# Patient Record
Sex: MALE
[De-identification: names, ages, dates, MRNs, and addresses within clinical notes are randomized per-mention and may not be internally consistent; named-entity substitution may affect disease eponyms.]

---

## 2017-09-12 ENCOUNTER — APPOINTMENT (OUTPATIENT)
Dept: UROLOGY | Facility: CLINIC | Age: 77
End: 2017-09-12
Payer: MEDICAID

## 2017-09-12 VITALS
SYSTOLIC BLOOD PRESSURE: 127 MMHG | RESPIRATION RATE: 18 BRPM | BODY MASS INDEX: 27.1 KG/M2 | TEMPERATURE: 97.9 F | HEART RATE: 91 BPM | OXYGEN SATURATION: 95 % | WEIGHT: 153 LBS | DIASTOLIC BLOOD PRESSURE: 73 MMHG

## 2017-09-12 DIAGNOSIS — R31.0 GROSS HEMATURIA: ICD-10-CM

## 2017-09-12 DIAGNOSIS — Z00.00 ENCOUNTER FOR GENERAL ADULT MEDICAL EXAMINATION W/OUT ABNORMAL FINDINGS: ICD-10-CM

## 2017-09-12 DIAGNOSIS — N30.40 IRRADIATION CYSTITIS W/OUT HEMATURIA: ICD-10-CM

## 2017-09-12 DIAGNOSIS — C61 MALIGNANT NEOPLASM OF PROSTATE: ICD-10-CM

## 2017-09-12 PROCEDURE — 99214 OFFICE O/P EST MOD 30 MIN: CPT

## 2017-09-19 LAB
APPEARANCE: CLEAR
BACTERIA: NEGATIVE
BILIRUBIN URINE: NEGATIVE
BLOOD URINE: NEGATIVE
COLOR: YELLOW
CORE LAB FLUID CYTOLOGY: NORMAL
GLUCOSE QUALITATIVE U: NORMAL MG/DL
HYALINE CASTS: 1 /LPF
KETONES URINE: NEGATIVE
LEUKOCYTE ESTERASE URINE: NEGATIVE
MICROSCOPIC-UA: NORMAL
NITRITE URINE: NEGATIVE
PH URINE: 5
PROTEIN URINE: 30 MG/DL
PSA SERPL-MCNC: <0.01 NG/ML
RED BLOOD CELLS URINE: 5 /HPF
SPECIFIC GRAVITY URINE: 1.03
SQUAMOUS EPITHELIAL CELLS: 1 /HPF
UROBILINOGEN URINE: NORMAL MG/DL
WHITE BLOOD CELLS URINE: 1 /HPF

## 2019-03-14 ENCOUNTER — RESULT REVIEW (OUTPATIENT)
Age: 79
End: 2019-03-14

## 2019-03-14 ENCOUNTER — OUTPATIENT (OUTPATIENT)
Dept: OUTPATIENT SERVICES | Facility: HOSPITAL | Age: 79
LOS: 1 days | End: 2019-03-14
Payer: MEDICAID

## 2019-03-14 DIAGNOSIS — Z90.79 ACQUIRED ABSENCE OF OTHER GENITAL ORGAN(S): Chronic | ICD-10-CM

## 2019-03-14 DIAGNOSIS — Z98.41 CATARACT EXTRACTION STATUS, RIGHT EYE: Chronic | ICD-10-CM

## 2019-03-14 DIAGNOSIS — Z86.010 PERSONAL HISTORY OF COLONIC POLYPS: ICD-10-CM

## 2019-03-14 PROCEDURE — C1889: CPT

## 2019-03-14 PROCEDURE — 45381 COLONOSCOPY SUBMUCOUS NJX: CPT

## 2019-03-14 PROCEDURE — 45385 COLONOSCOPY W/LESION REMOVAL: CPT

## 2021-12-20 PROBLEM — Z00.00 ENCOUNTER FOR PREVENTIVE HEALTH EXAMINATION: Noted: 2021-12-20

## 2021-12-27 ENCOUNTER — APPOINTMENT (OUTPATIENT)
Dept: COLORECTAL SURGERY | Facility: CLINIC | Age: 81
End: 2021-12-27
Payer: MEDICARE

## 2021-12-27 VITALS
BODY MASS INDEX: 26.75 KG/M2 | HEIGHT: 63 IN | DIASTOLIC BLOOD PRESSURE: 61 MMHG | HEART RATE: 88 BPM | SYSTOLIC BLOOD PRESSURE: 126 MMHG | WEIGHT: 151 LBS | TEMPERATURE: 98.1 F

## 2021-12-27 DIAGNOSIS — G47.30 SLEEP APNEA, UNSPECIFIED: ICD-10-CM

## 2021-12-27 DIAGNOSIS — E78.00 PURE HYPERCHOLESTEROLEMIA, UNSPECIFIED: ICD-10-CM

## 2021-12-27 DIAGNOSIS — Z80.42 FAMILY HISTORY OF MALIGNANT NEOPLASM OF PROSTATE: ICD-10-CM

## 2021-12-27 DIAGNOSIS — Z78.9 OTHER SPECIFIED HEALTH STATUS: ICD-10-CM

## 2021-12-27 DIAGNOSIS — M19.90 UNSPECIFIED OSTEOARTHRITIS, UNSPECIFIED SITE: ICD-10-CM

## 2021-12-27 DIAGNOSIS — K63.89 OTHER SPECIFIED DISEASES OF INTESTINE: ICD-10-CM

## 2021-12-27 DIAGNOSIS — Z85.46 PERSONAL HISTORY OF MALIGNANT NEOPLASM OF PROSTATE: ICD-10-CM

## 2021-12-27 DIAGNOSIS — Z80.0 FAMILY HISTORY OF MALIGNANT NEOPLASM OF DIGESTIVE ORGANS: ICD-10-CM

## 2021-12-27 PROCEDURE — 99205 OFFICE O/P NEW HI 60 MIN: CPT

## 2021-12-27 NOTE — ASSESSMENT
[FreeTextEntry1] : I had extensive discussion with the patient (60 minutes) regarding the diagnosis and treatment options. I recommended that he consider proceeding with a robotic transverse colectomy possible extended right hemicolectomy.\par The associated risks, benefits, alternatives of the procedure have been outlined discussed and reviewed with the patient's family. These risks including but not limited to bleeding, infection, anastomotic leak, need for secondary surgery, need for ileostomy or colostomy creation, change in bowel habits, dvt,pe, as well as the risk of heart and lung complications infection and death were detailed. The patient understands these risks and consents the planned procedure. Appropriate  literature regarding surgery and post operative treatment/complications and enhanced recovery pathway has been detailed and reviewed. Consent was obtained. All questions were answered.\par \par A chinese  was utilized for the entire visit . All questions were addressed.\par

## 2021-12-27 NOTE — PHYSICAL EXAM
[Abdomen Masses] : No abdominal masses [Abdomen Tenderness] : ~T No ~M abdominal tenderness [No HSM] : no hepatosplenomegaly [JVD] : no jugular venous distention  [Normal Breath Sounds] : Normal breath sounds [Normal Heart Sounds] : normal heart sounds [No Rash or Lesion] : No rash or lesion [Alert] : not alert [Calm] : calm

## 2021-12-27 NOTE — HISTORY OF PRESENT ILLNESS
[FreeTextEntry1] : 80 yo Mandarin speaking M presents w/ son for evaluation of newly diagnosed colon cancer\par PMH HLD, DM type II, arthritis, sleep apnea (not on CPAP), cardiac arrhythmia (under the care of Dr. 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 Denies h/o chemo. PCP monitors PSA and otherwise normal per patient\par \par Colonoscopy completed 12/18/21 with Dr. Allen for rectal bleeding and lower abdominal pain\par - infiltrative non-obstructing mass found in the transverse colon. Mass was partially circumferential and measured 4 cm. Biopsied and tattooed proximal to the lesion\par - small internal hemorrhoids\par Pathology:\par Fragments of colonic mucosa w/ HGD, suspicious for underlying invasive adenocarcinoma\par Scheduled for CT a/p w/ contrast at [facility] on 12/27/21\par \par Last colonoscopy completed 12/2018, TVA removed\par h/o laterally spreading granular polyp measuring 2 cm in transverse colon on 12/2018 colonoscopy, biopsy c/w TVA, s/p EMR at [facility] 1/2019 for removal of polyp w/ negative pathology as per GI notes\par \par Pt reports he is doing well. Appetite and energy good, denies unintentional weight loss\par Pt denies h/o abdominal pain.\par Reports occasional BRB noted on TP w/ some BMs\par BH: three times daily, soft/formed\par Reports adequate dietary fiber and water intake\par Does not take stool softeners or fiber supplement\par Mother diagnosed w/ colon cancer this year, age 90's. Likely palliative care, has home attendant and lives w/ patient\par Denies ASA/NSAID use

## 2022-01-10 ENCOUNTER — APPOINTMENT (OUTPATIENT)
Dept: CT IMAGING | Facility: CLINIC | Age: 82
End: 2022-01-10
Payer: MEDICARE

## 2022-01-10 ENCOUNTER — OUTPATIENT (OUTPATIENT)
Dept: OUTPATIENT SERVICES | Facility: HOSPITAL | Age: 82
LOS: 1 days | End: 2022-01-10

## 2022-01-10 ENCOUNTER — RESULT REVIEW (OUTPATIENT)
Age: 82
End: 2022-01-10

## 2022-01-10 DIAGNOSIS — Z98.41 CATARACT EXTRACTION STATUS, RIGHT EYE: Chronic | ICD-10-CM

## 2022-01-10 DIAGNOSIS — Z90.79 ACQUIRED ABSENCE OF OTHER GENITAL ORGAN(S): Chronic | ICD-10-CM

## 2022-01-10 PROCEDURE — 71260 CT THORAX DX C+: CPT | Mod: 26

## 2022-01-11 ENCOUNTER — TRANSCRIPTION ENCOUNTER (OUTPATIENT)
Age: 82
End: 2022-01-11

## 2022-01-11 VITALS
OXYGEN SATURATION: 96 % | HEIGHT: 63 IN | SYSTOLIC BLOOD PRESSURE: 127 MMHG | TEMPERATURE: 99 F | WEIGHT: 151.02 LBS | DIASTOLIC BLOOD PRESSURE: 71 MMHG | RESPIRATION RATE: 16 BRPM | HEART RATE: 91 BPM

## 2022-01-11 RX ORDER — ACETAMINOPHEN 500 MG
1000 TABLET ORAL ONCE
Refills: 0 | Status: COMPLETED | OUTPATIENT
Start: 2022-01-12 | End: 2022-01-12

## 2022-01-11 RX ORDER — HEPARIN SODIUM 5000 [USP'U]/ML
5000 INJECTION INTRAVENOUS; SUBCUTANEOUS ONCE
Refills: 0 | Status: COMPLETED | OUTPATIENT
Start: 2022-01-12 | End: 2022-01-12

## 2022-01-12 ENCOUNTER — RESULT REVIEW (OUTPATIENT)
Age: 82
End: 2022-01-12

## 2022-01-12 ENCOUNTER — INPATIENT (INPATIENT)
Facility: HOSPITAL | Age: 82
LOS: 1 days | Discharge: HOME | DRG: 331 | End: 2022-01-14
Attending: SURGERY | Admitting: SURGERY
Payer: MEDICARE

## 2022-01-12 ENCOUNTER — APPOINTMENT (OUTPATIENT)
Dept: COLORECTAL SURGERY | Facility: HOSPITAL | Age: 82
End: 2022-01-12

## 2022-01-12 DIAGNOSIS — Z90.79 ACQUIRED ABSENCE OF OTHER GENITAL ORGAN(S): Chronic | ICD-10-CM

## 2022-01-12 DIAGNOSIS — Z41.9 ENCOUNTER FOR PROCEDURE FOR PURPOSES OTHER THAN REMEDYING HEALTH STATE, UNSPECIFIED: Chronic | ICD-10-CM

## 2022-01-12 DIAGNOSIS — Z98.41 CATARACT EXTRACTION STATUS, RIGHT EYE: Chronic | ICD-10-CM

## 2022-01-12 LAB
BLD GP AB SCN SERPL QL: NEGATIVE — SIGNIFICANT CHANGE UP
RH IG SCN BLD-IMP: POSITIVE — SIGNIFICANT CHANGE UP

## 2022-01-12 PROCEDURE — 44204 LAPARO PARTIAL COLECTOMY: CPT | Mod: GC

## 2022-01-12 PROCEDURE — 44213 LAP MOBIL SPLENIC FL ADD-ON: CPT | Mod: GC

## 2022-01-12 PROCEDURE — 88341 IMHCHEM/IMCYTCHM EA ADD ANTB: CPT | Mod: 26

## 2022-01-12 PROCEDURE — 88309 TISSUE EXAM BY PATHOLOGIST: CPT | Mod: 26

## 2022-01-12 PROCEDURE — 88342 IMHCHEM/IMCYTCHM 1ST ANTB: CPT | Mod: 26

## 2022-01-12 DEVICE — XI STAPLER SUREFORM RELOAD 60 BLUE: Type: IMPLANTABLE DEVICE | Status: FUNCTIONAL

## 2022-01-12 RX ORDER — HEPARIN SODIUM 5000 [USP'U]/ML
5000 INJECTION INTRAVENOUS; SUBCUTANEOUS EVERY 8 HOURS
Refills: 0 | Status: DISCONTINUED | OUTPATIENT
Start: 2022-01-12 | End: 2022-01-14

## 2022-01-12 RX ORDER — ACETAMINOPHEN 500 MG
1000 TABLET ORAL EVERY 6 HOURS
Refills: 0 | Status: DISCONTINUED | OUTPATIENT
Start: 2022-01-12 | End: 2022-01-14

## 2022-01-12 RX ORDER — DEXTROSE 50 % IN WATER 50 %
12.5 SYRINGE (ML) INTRAVENOUS ONCE
Refills: 0 | Status: DISCONTINUED | OUTPATIENT
Start: 2022-01-12 | End: 2022-01-14

## 2022-01-12 RX ORDER — DEXTROSE 50 % IN WATER 50 %
25 SYRINGE (ML) INTRAVENOUS ONCE
Refills: 0 | Status: DISCONTINUED | OUTPATIENT
Start: 2022-01-12 | End: 2022-01-14

## 2022-01-12 RX ORDER — METOPROLOL TARTRATE 50 MG
25 TABLET ORAL DAILY
Refills: 0 | Status: DISCONTINUED | OUTPATIENT
Start: 2022-01-12 | End: 2022-01-14

## 2022-01-12 RX ORDER — SODIUM CHLORIDE 9 MG/ML
1000 INJECTION, SOLUTION INTRAVENOUS
Refills: 0 | Status: DISCONTINUED | OUTPATIENT
Start: 2022-01-12 | End: 2022-01-14

## 2022-01-12 RX ORDER — KETOROLAC TROMETHAMINE 30 MG/ML
15 SYRINGE (ML) INJECTION EVERY 6 HOURS
Refills: 0 | Status: DISCONTINUED | OUTPATIENT
Start: 2022-01-12 | End: 2022-01-14

## 2022-01-12 RX ORDER — ACETAMINOPHEN 500 MG
1000 TABLET ORAL ONCE
Refills: 0 | Status: COMPLETED | OUTPATIENT
Start: 2022-01-12 | End: 2022-01-12

## 2022-01-12 RX ORDER — BUPIVACAINE 13.3 MG/ML
20 INJECTION, SUSPENSION, LIPOSOMAL INFILTRATION ONCE
Refills: 0 | Status: DISCONTINUED | OUTPATIENT
Start: 2022-01-12 | End: 2022-01-14

## 2022-01-12 RX ORDER — ONDANSETRON 8 MG/1
4 TABLET, FILM COATED ORAL ONCE
Refills: 0 | Status: COMPLETED | OUTPATIENT
Start: 2022-01-12 | End: 2022-01-12

## 2022-01-12 RX ORDER — HYDROMORPHONE HYDROCHLORIDE 2 MG/ML
0.5 INJECTION INTRAMUSCULAR; INTRAVENOUS; SUBCUTANEOUS EVERY 4 HOURS
Refills: 0 | Status: DISCONTINUED | OUTPATIENT
Start: 2022-01-12 | End: 2022-01-14

## 2022-01-12 RX ORDER — ONDANSETRON 8 MG/1
4 TABLET, FILM COATED ORAL EVERY 6 HOURS
Refills: 0 | Status: DISCONTINUED | OUTPATIENT
Start: 2022-01-12 | End: 2022-01-14

## 2022-01-12 RX ORDER — GLUCAGON INJECTION, SOLUTION 0.5 MG/.1ML
1 INJECTION, SOLUTION SUBCUTANEOUS ONCE
Refills: 0 | Status: DISCONTINUED | OUTPATIENT
Start: 2022-01-12 | End: 2022-01-14

## 2022-01-12 RX ORDER — INSULIN LISPRO 100/ML
VIAL (ML) SUBCUTANEOUS
Refills: 0 | Status: DISCONTINUED | OUTPATIENT
Start: 2022-01-12 | End: 2022-01-14

## 2022-01-12 RX ORDER — DEXTROSE 50 % IN WATER 50 %
15 SYRINGE (ML) INTRAVENOUS ONCE
Refills: 0 | Status: DISCONTINUED | OUTPATIENT
Start: 2022-01-12 | End: 2022-01-14

## 2022-01-12 RX ADMIN — Medication 1000 MILLIGRAM(S): at 21:39

## 2022-01-12 RX ADMIN — Medication 1000 MILLIGRAM(S): at 22:09

## 2022-01-12 RX ADMIN — Medication 1000 MILLIGRAM(S): at 15:45

## 2022-01-12 RX ADMIN — Medication 2: at 22:00

## 2022-01-12 RX ADMIN — Medication 400 MILLIGRAM(S): at 12:55

## 2022-01-12 RX ADMIN — Medication 2: at 15:58

## 2022-01-12 RX ADMIN — HEPARIN SODIUM 5000 UNIT(S): 5000 INJECTION INTRAVENOUS; SUBCUTANEOUS at 21:39

## 2022-01-12 RX ADMIN — HEPARIN SODIUM 5000 UNIT(S): 5000 INJECTION INTRAVENOUS; SUBCUTANEOUS at 07:21

## 2022-01-12 RX ADMIN — Medication 1000 MILLIGRAM(S): at 07:20

## 2022-01-12 RX ADMIN — Medication 15 MILLIGRAM(S): at 18:24

## 2022-01-12 RX ADMIN — ONDANSETRON 4 MILLIGRAM(S): 8 TABLET, FILM COATED ORAL at 19:26

## 2022-01-12 RX ADMIN — Medication 15 MILLIGRAM(S): at 18:39

## 2022-01-12 RX ADMIN — ONDANSETRON 4 MILLIGRAM(S): 8 TABLET, FILM COATED ORAL at 15:41

## 2022-01-12 RX ADMIN — SODIUM CHLORIDE 40 MILLILITER(S): 9 INJECTION, SOLUTION INTRAVENOUS at 18:24

## 2022-01-12 NOTE — H&P ADULT - NSHPPHYSICALEXAM_GEN_ALL_CORE
Constitutional: WDWN NAD  Heart: s1 s2  Lungs: no use of accessory muscles  Abdomen: soft, nontender, nondistended, without masses, erythema, ecchymosis  Extremities: -edema

## 2022-01-12 NOTE — BRIEF OPERATIVE NOTE - OPERATION/FINDINGS
Distal TC lesion. Resection of distal TC, splenic flexure, and descending colon. Side to side stapled isoperistaltic anastomosis with 60 mm stapler.

## 2022-01-12 NOTE — H&P ADULT - HISTORY OF PRESENT ILLNESS
81 year old Mandarin speaking male with PMH of HLD, DM type 2, arthritis, ANNABELLE (not on CPAP), cardiac arrhythmia, hard of hearing, newly diagnosed colon cancer presents for left hemicolectomy. Patient initially presented with rectal bleeding and underwent colonoscopy on 12/18/21 whic found an infiltrative non-obstructing mass found in the transverse colon, pathology consistent with fragments of colonic mucosa with high grade dysplasia suspicious for underlying adenocarcinoma. Patient currently does not have complaints.

## 2022-01-12 NOTE — H&P ADULT - NSICDXPASTMEDICALHX_GEN_ALL_CORE_FT
PAST MEDICAL HISTORY:  Colon cancer     DM (diabetes mellitus)     HLD (hyperlipidemia)     Prostate cancer

## 2022-01-12 NOTE — H&P ADULT - ASSESSMENT
81 year old Mandarin speaking male with PMH of HLD, DM type 2, arthritis, ANNABELLE (not on CPAP), cardiac arrhythmia, hard of hearing, newly diagnosed colon cancer presents for left hemicolectomy.    Plan:  to OR  admission post op  ERAS protocol  Colon bundle

## 2022-01-13 ENCOUNTER — TRANSCRIPTION ENCOUNTER (OUTPATIENT)
Age: 82
End: 2022-01-13

## 2022-01-13 LAB
A1C WITH ESTIMATED AVERAGE GLUCOSE RESULT: 6.2 % — HIGH (ref 4–5.6)
ANION GAP SERPL CALC-SCNC: 8 MMOL/L — SIGNIFICANT CHANGE UP (ref 5–17)
BUN SERPL-MCNC: 13 MG/DL — SIGNIFICANT CHANGE UP (ref 7–23)
CALCIUM SERPL-MCNC: 8.3 MG/DL — LOW (ref 8.4–10.5)
CHLORIDE SERPL-SCNC: 104 MMOL/L — SIGNIFICANT CHANGE UP (ref 96–108)
CO2 SERPL-SCNC: 26 MMOL/L — SIGNIFICANT CHANGE UP (ref 22–31)
CREAT SERPL-MCNC: 0.85 MG/DL — SIGNIFICANT CHANGE UP (ref 0.5–1.3)
ESTIMATED AVERAGE GLUCOSE: 131 MG/DL — HIGH (ref 68–114)
GLUCOSE SERPL-MCNC: 108 MG/DL — HIGH (ref 70–99)
HCT VFR BLD CALC: 32.7 % — LOW (ref 39–50)
HGB BLD-MCNC: 11.1 G/DL — LOW (ref 13–17)
MAGNESIUM SERPL-MCNC: 2.2 MG/DL — SIGNIFICANT CHANGE UP (ref 1.6–2.6)
MCHC RBC-ENTMCNC: 31.1 PG — SIGNIFICANT CHANGE UP (ref 27–34)
MCHC RBC-ENTMCNC: 33.9 GM/DL — SIGNIFICANT CHANGE UP (ref 32–36)
MCV RBC AUTO: 91.6 FL — SIGNIFICANT CHANGE UP (ref 80–100)
NRBC # BLD: 0 /100 WBCS — SIGNIFICANT CHANGE UP (ref 0–0)
PHOSPHATE SERPL-MCNC: 3.6 MG/DL — SIGNIFICANT CHANGE UP (ref 2.5–4.5)
PLATELET # BLD AUTO: 225 K/UL — SIGNIFICANT CHANGE UP (ref 150–400)
POTASSIUM SERPL-MCNC: 3.5 MMOL/L — SIGNIFICANT CHANGE UP (ref 3.5–5.3)
POTASSIUM SERPL-SCNC: 3.5 MMOL/L — SIGNIFICANT CHANGE UP (ref 3.5–5.3)
RBC # BLD: 3.57 M/UL — LOW (ref 4.2–5.8)
RBC # FLD: 13.2 % — SIGNIFICANT CHANGE UP (ref 10.3–14.5)
SODIUM SERPL-SCNC: 138 MMOL/L — SIGNIFICANT CHANGE UP (ref 135–145)
WBC # BLD: 9.81 K/UL — SIGNIFICANT CHANGE UP (ref 3.8–10.5)
WBC # FLD AUTO: 9.81 K/UL — SIGNIFICANT CHANGE UP (ref 3.8–10.5)

## 2022-01-13 RX ORDER — POTASSIUM CHLORIDE 20 MEQ
20 PACKET (EA) ORAL
Refills: 0 | Status: DISCONTINUED | OUTPATIENT
Start: 2022-01-13 | End: 2022-01-13

## 2022-01-13 RX ORDER — BENZOCAINE AND MENTHOL 5; 1 G/100ML; G/100ML
1 LIQUID ORAL
Refills: 0 | Status: DISCONTINUED | OUTPATIENT
Start: 2022-01-13 | End: 2022-01-13

## 2022-01-13 RX ORDER — BENZOCAINE AND MENTHOL 5; 1 G/100ML; G/100ML
1 LIQUID ORAL
Refills: 0 | Status: DISCONTINUED | OUTPATIENT
Start: 2022-01-13 | End: 2022-01-14

## 2022-01-13 RX ORDER — POTASSIUM CHLORIDE 20 MEQ
40 PACKET (EA) ORAL ONCE
Refills: 0 | Status: COMPLETED | OUTPATIENT
Start: 2022-01-13 | End: 2022-01-13

## 2022-01-13 RX ADMIN — Medication 1000 MILLIGRAM(S): at 03:30

## 2022-01-13 RX ADMIN — HEPARIN SODIUM 5000 UNIT(S): 5000 INJECTION INTRAVENOUS; SUBCUTANEOUS at 06:08

## 2022-01-13 RX ADMIN — SODIUM CHLORIDE 40 MILLILITER(S): 9 INJECTION, SOLUTION INTRAVENOUS at 00:12

## 2022-01-13 RX ADMIN — Medication 15 MILLIGRAM(S): at 00:12

## 2022-01-13 RX ADMIN — Medication 1000 MILLIGRAM(S): at 03:01

## 2022-01-13 RX ADMIN — Medication 1000 MILLIGRAM(S): at 14:25

## 2022-01-13 RX ADMIN — Medication 15 MILLIGRAM(S): at 00:22

## 2022-01-13 RX ADMIN — Medication 15 MILLIGRAM(S): at 11:31

## 2022-01-13 RX ADMIN — Medication 1000 MILLIGRAM(S): at 22:30

## 2022-01-13 RX ADMIN — Medication 1000 MILLIGRAM(S): at 08:20

## 2022-01-13 RX ADMIN — Medication 25 MILLIGRAM(S): at 06:09

## 2022-01-13 RX ADMIN — Medication 15 MILLIGRAM(S): at 06:09

## 2022-01-13 RX ADMIN — Medication 1000 MILLIGRAM(S): at 14:55

## 2022-01-13 RX ADMIN — Medication 15 MILLIGRAM(S): at 17:43

## 2022-01-13 RX ADMIN — Medication 40 MILLIEQUIVALENT(S): at 11:16

## 2022-01-13 RX ADMIN — HEPARIN SODIUM 5000 UNIT(S): 5000 INJECTION INTRAVENOUS; SUBCUTANEOUS at 22:02

## 2022-01-13 RX ADMIN — Medication 1000 MILLIGRAM(S): at 22:02

## 2022-01-13 RX ADMIN — Medication 15 MILLIGRAM(S): at 06:15

## 2022-01-13 RX ADMIN — Medication 15 MILLIGRAM(S): at 11:16

## 2022-01-13 RX ADMIN — HEPARIN SODIUM 5000 UNIT(S): 5000 INJECTION INTRAVENOUS; SUBCUTANEOUS at 13:06

## 2022-01-13 RX ADMIN — Medication 1000 MILLIGRAM(S): at 08:50

## 2022-01-13 RX ADMIN — Medication 15 MILLIGRAM(S): at 18:00

## 2022-01-13 NOTE — DISCHARGE NOTE PROVIDER - NSDCCPCAREPLAN_GEN_ALL_CORE_FT
PRINCIPAL DISCHARGE DIAGNOSIS  Diagnosis: Colon cancer  Assessment and Plan of Treatment:        PRINCIPAL DISCHARGE DIAGNOSIS  Diagnosis: Colon cancer  Assessment and Plan of Treatment: Patient is s/p robotic assisted left hemicolectomy. Patient tolerated the procedure well. Patient began having bowel function and his diet was concurrently advanced. Patient was discharged in stable condition with plan for follow-up.

## 2022-01-13 NOTE — DISCHARGE NOTE PROVIDER - NSDCFUADDAPPT_GEN_ALL_CORE_FT
Please follow-up with Dr. Reilly in 2 weeks. Call the office to schedule a follow-up appointment (call at [phone])

## 2022-01-13 NOTE — DISCHARGE NOTE PROVIDER - HOSPITAL COURSE
81 year old Mandarin speaking male with PMH of HLD, DM type 2, arthritis, ANNABELLE (not on CPAP), cardiac arrhythmia, hard of hearing, newly diagnosed colon cancer presents for left hemicolectomy. Patient initially presented with rectal bleeding and underwent colonoscopy on 12/18/21 whic found an infiltrative non-obstructing mass found in the transverse colon, pathology consistent with fragments of colonic mucosa with high grade dysplasia suspicious for underlying adenocarcinoma. Now s/p robotic assisted left hemicolectomy on 1/12.     81 year old Mandarin speaking male with PMH of HLD, DM type 2, arthritis, ANNABELLE (not on CPAP), cardiac arrhythmia, hard of hearing, newly diagnosed colon cancer presents for left hemicolectomy. Patient initially presented with rectal bleeding and underwent colonoscopy on 12/18/21 whic found an infiltrative non-obstructing mass found in the transverse colon, pathology consistent with fragments of colonic mucosa with high grade dysplasia suspicious for underlying adenocarcinoma. Now s/p robotic assisted left hemicolectomy on 1/12. Patient's post operative course was uncomplicated. Patient received maintenance IV fluids, an incentive spirometer with instructions, DVT prophylaxis, daily labs, as well as home medications. Patient met post-operative milestones. Pain and nausea was well controlled. Patient was tolerating diet without nausea or vomiting. Patient was passing gas and having BM and was ambulating without difficulties. Patient was stable and deemed appropriate for discharge. He was discharged home in good condition with follow up instructions. He will follow-up in clinic.

## 2022-01-13 NOTE — DISCHARGE NOTE PROVIDER - CARE PROVIDER_API CALL
Tyree Reilly)  ColonRectal Surgery; Surgery  [facility], [address] [place] [address]  Phone: [phone]  Fax: [phone]  Follow Up Time: 2 weeks

## 2022-01-13 NOTE — DISCHARGE NOTE PROVIDER - NSDCFUADDINST_GEN_ALL_CORE_FT
Low Fiber Diet:  Vegetables should be initially be cooked (baked, steamed, blanched, etc)  May want to start with peeled and/or canned fruit  Limit fat/oil intake and fried/greasy foods  Add small amounts of fiber back in to the diet every couple diet    General Discharge Instructions:  Please resume all regular home medications unless specifically advised not to take a particular medication.   Please get plenty of rest, continue to ambulate several times per day, and drink adequate amounts of fluids. Avoid lifting weights greater than 5-10 lbs until you follow-up with your surgeon, who will instruct you further regarding activity restrictions.  Avoid driving or operating heavy machinery while taking pain medications.    Incision Care:  *Please call your doctor or nurse practitioner if you have increased pain, swelling, redness, or drainage from the incision site.  *Avoid swimming and baths until your follow-up appointment.  *You may shower, and wash surgical incisions with a mild soap and warm water. Gently pat the area dry.         Call the office at  to schedule your appointment.  You may shower; soap and water over incision sites. Do not scrub. Pat dry when done. No tub bathing or swimming until cleared. Keep incision sites out of the sun as scars will darken. No heavy lifting (>10lbs) or strenuous exercise. Diet: low fiber diet as tolerated. 64 fluid ounces water daily. Drink small sips throughout the day. Continue diet as outlined by your surgeon. You should be urinating at least 3-4x per day. Call the office if you experience increasing abdominal pain, nausea, vomiting, or temperature >100.4F.  NO ASPIRIN OR NSAIDs until approved by Dr. Reilly. Avoid alcoholic beverages until cleared by Dr. Reilly.    Low Fiber Diet:  Vegetables should be initially be cooked (baked, steamed, blanched, etc)  May want to start with peeled and/or canned fruit  Limit fat/oil intake and fried/greasy foods  Add small amounts of fiber back in to the diet every couple diet    General Discharge Instructions:  Please resume all regular home medications unless specifically advised not to take a particular medication.   Please get plenty of rest, continue to ambulate several times per day, and drink adequate amounts of fluids. Avoid lifting weights greater than 5-10 lbs until you follow-up with your surgeon, who will instruct you further regarding activity restrictions.  Avoid driving or operating heavy machinery while taking pain medications.    Incision Care:  *Please call your doctor or nurse practitioner if you have increased pain, swelling, redness, or drainage from the incision site.  *Avoid swimming and baths until your follow-up appointment.  *You may shower, and wash surgical incisions with a mild soap and warm water. Gently pat the area dry.     Call the office at  to schedule your appointment.  You may shower; soap and water over incision sites. Do not scrub. Pat dry when done. No tub bathing or swimming until cleared. Keep incision sites out of the sun as scars will darken. No heavy lifting (>10lbs) or strenuous exercise. Diet: low fiber diet as tolerated. 64 fluid ounces water daily. Drink small sips throughout the day. Continue diet as outlined by your surgeon. You should be urinating at least 3-4x per day. Call the office if you experience increasing abdominal pain, nausea, vomiting, or temperature >100.4F.  NO ASPIRIN OR NSAIDs until approved by Dr. Reilly. Avoid alcoholic beverages until cleared by Dr. Reilly.    Medications:  Please take all medications as prescribed. Do NOT take Tylenol with Percocet; you may alternate doses every 6 hours.   Low Fiber Diet:  Vegetables should be initially be cooked (baked, steamed, blanched, etc)  May want to start with peeled and/or canned fruit  Limit fat/oil intake and fried/greasy foods  Add small amounts of fiber back in to the diet every couple diet    General Discharge Instructions:  Please resume all regular home medications unless specifically advised not to take a particular medication.   Please get plenty of rest, continue to ambulate several times per day, and drink adequate amounts of fluids. Avoid lifting weights greater than 5-10 lbs until you follow-up with your surgeon, who will instruct you further regarding activity restrictions.  Avoid driving or operating heavy machinery while taking pain medications.    Incision Care:  *Please call your doctor or nurse practitioner if you have increased pain, swelling, redness, or drainage from the incision site.  *Avoid swimming and baths until your follow-up appointment.  *You may shower, and wash surgical incisions with a mild soap and warm water. Gently pat the area dry.

## 2022-01-13 NOTE — DISCHARGE NOTE PROVIDER - NSDCACTIVITY_GEN_ALL_CORE
Walking - Indoors allowed/No heavy lifting/straining/Walking - Outdoors allowed Return to Work/School allowed/Showering allowed/Stairs allowed/Walking - Indoors allowed/No heavy lifting/straining/Walking - Outdoors allowed/Follow Instructions Provided by your Surgical Team

## 2022-01-13 NOTE — DISCHARGE NOTE PROVIDER - NSDCCPTREATMENT_GEN_ALL_CORE_FT
PRINCIPAL PROCEDURE  Procedure: Robot-assisted left colectomy  Findings and Treatment:        Billing Type: Third-Party Bill

## 2022-01-13 NOTE — DISCHARGE NOTE PROVIDER - NSDCMRMEDTOKEN_GEN_ALL_CORE_FT
metFORMIN 500 mg oral tablet: orally once a day  metoprolol succinate 25 mg oral tablet, extended release: 1 tab(s) orally once a day  Vascepa 1 g oral capsule: 2 cap(s) orally 2 times a day  Vitamin D3 50 mcg (2000 intl units) oral tablet: 1 tab(s) orally once a day   metFORMIN 500 mg oral tablet: orally once a day  metoprolol succinate 25 mg oral tablet, extended release: 1 tab(s) orally once a day  Tylenol 325 mg oral tablet: 2 tab(s) orally every 6 hours, As Needed -for moderate pain - for mild pain   Vascepa 1 g oral capsule: 2 cap(s) orally 2 times a day  Vitamin D3 50 mcg (2000 intl units) oral tablet: 1 tab(s) orally once a day   Colace 100 mg oral capsule: 1 cap(s) orally 2 times a day   metFORMIN 500 mg oral tablet: orally once a day  metoprolol succinate 25 mg oral tablet, extended release: 1 tab(s) orally once a day  Percocet 5 mg-325 mg oral tablet: 1 tab(s) orally every 6 hours, As Needed -for severe pain MDD:4 tablets   Vascepa 1 g oral capsule: 2 cap(s) orally 2 times a day  Vitamin D3 50 mcg (2000 intl units) oral tablet: 1 tab(s) orally once a day

## 2022-01-14 ENCOUNTER — TRANSCRIPTION ENCOUNTER (OUTPATIENT)
Age: 82
End: 2022-01-14

## 2022-01-14 VITALS
DIASTOLIC BLOOD PRESSURE: 74 MMHG | SYSTOLIC BLOOD PRESSURE: 133 MMHG | OXYGEN SATURATION: 95 % | RESPIRATION RATE: 16 BRPM | TEMPERATURE: 99 F | HEART RATE: 76 BPM

## 2022-01-14 LAB
ANION GAP SERPL CALC-SCNC: 9 MMOL/L — SIGNIFICANT CHANGE UP (ref 5–17)
BUN SERPL-MCNC: 12 MG/DL — SIGNIFICANT CHANGE UP (ref 7–23)
CALCIUM SERPL-MCNC: 8.1 MG/DL — LOW (ref 8.4–10.5)
CHLORIDE SERPL-SCNC: 109 MMOL/L — HIGH (ref 96–108)
CO2 SERPL-SCNC: 22 MMOL/L — SIGNIFICANT CHANGE UP (ref 22–31)
CREAT SERPL-MCNC: 0.78 MG/DL — SIGNIFICANT CHANGE UP (ref 0.5–1.3)
GLUCOSE SERPL-MCNC: 100 MG/DL — HIGH (ref 70–99)
HCT VFR BLD CALC: 31.8 % — LOW (ref 39–50)
HCT VFR BLD CALC: 32.7 % — LOW (ref 39–50)
HGB BLD-MCNC: 10.5 G/DL — LOW (ref 13–17)
HGB BLD-MCNC: 10.7 G/DL — LOW (ref 13–17)
MAGNESIUM SERPL-MCNC: 2.3 MG/DL — SIGNIFICANT CHANGE UP (ref 1.6–2.6)
MCHC RBC-ENTMCNC: 30.4 PG — SIGNIFICANT CHANGE UP (ref 27–34)
MCHC RBC-ENTMCNC: 31.3 PG — SIGNIFICANT CHANGE UP (ref 27–34)
MCHC RBC-ENTMCNC: 32.1 GM/DL — SIGNIFICANT CHANGE UP (ref 32–36)
MCHC RBC-ENTMCNC: 33.6 GM/DL — SIGNIFICANT CHANGE UP (ref 32–36)
MCV RBC AUTO: 93 FL — SIGNIFICANT CHANGE UP (ref 80–100)
MCV RBC AUTO: 94.8 FL — SIGNIFICANT CHANGE UP (ref 80–100)
NRBC # BLD: 0 /100 WBCS — SIGNIFICANT CHANGE UP (ref 0–0)
NRBC # BLD: 0 /100 WBCS — SIGNIFICANT CHANGE UP (ref 0–0)
PHOSPHATE SERPL-MCNC: 1.9 MG/DL — LOW (ref 2.5–4.5)
PLATELET # BLD AUTO: 205 K/UL — SIGNIFICANT CHANGE UP (ref 150–400)
PLATELET # BLD AUTO: 218 K/UL — SIGNIFICANT CHANGE UP (ref 150–400)
POTASSIUM SERPL-MCNC: 4.1 MMOL/L — SIGNIFICANT CHANGE UP (ref 3.5–5.3)
POTASSIUM SERPL-SCNC: 4.1 MMOL/L — SIGNIFICANT CHANGE UP (ref 3.5–5.3)
RBC # BLD: 3.42 M/UL — LOW (ref 4.2–5.8)
RBC # BLD: 3.45 M/UL — LOW (ref 4.2–5.8)
RBC # FLD: 13.4 % — SIGNIFICANT CHANGE UP (ref 10.3–14.5)
RBC # FLD: 13.7 % — SIGNIFICANT CHANGE UP (ref 10.3–14.5)
SODIUM SERPL-SCNC: 140 MMOL/L — SIGNIFICANT CHANGE UP (ref 135–145)
WBC # BLD: 10.57 K/UL — HIGH (ref 3.8–10.5)
WBC # BLD: 9.34 K/UL — SIGNIFICANT CHANGE UP (ref 3.8–10.5)
WBC # FLD AUTO: 10.57 K/UL — HIGH (ref 3.8–10.5)
WBC # FLD AUTO: 9.34 K/UL — SIGNIFICANT CHANGE UP (ref 3.8–10.5)

## 2022-01-14 RX ORDER — SODIUM,POTASSIUM PHOSPHATES 278-250MG
3 POWDER IN PACKET (EA) ORAL ONCE
Refills: 0 | Status: COMPLETED | OUTPATIENT
Start: 2022-01-14 | End: 2022-01-14

## 2022-01-14 RX ORDER — DOCUSATE SODIUM 100 MG
1 CAPSULE ORAL
Qty: 60 | Refills: 0
Start: 2022-01-14 | End: 2022-02-12

## 2022-01-14 RX ORDER — ACETAMINOPHEN 500 MG
2 TABLET ORAL
Qty: 40 | Refills: 0
Start: 2022-01-14 | End: 2022-01-18

## 2022-01-14 RX ORDER — OXYCODONE AND ACETAMINOPHEN 5; 325 MG/1; MG/1
1 TABLET ORAL
Qty: 20 | Refills: 0
Start: 2022-01-14 | End: 2022-01-18

## 2022-01-14 RX ORDER — METOPROLOL TARTRATE 50 MG
1 TABLET ORAL
Qty: 0 | Refills: 0 | DISCHARGE
Start: 2022-01-14

## 2022-01-14 RX ADMIN — HEPARIN SODIUM 5000 UNIT(S): 5000 INJECTION INTRAVENOUS; SUBCUTANEOUS at 13:28

## 2022-01-14 RX ADMIN — Medication 25 MILLIGRAM(S): at 07:04

## 2022-01-14 RX ADMIN — Medication 3 PACKET(S): at 09:25

## 2022-01-14 RX ADMIN — Medication 1000 MILLIGRAM(S): at 10:00

## 2022-01-14 RX ADMIN — HEPARIN SODIUM 5000 UNIT(S): 5000 INJECTION INTRAVENOUS; SUBCUTANEOUS at 07:04

## 2022-01-14 RX ADMIN — Medication 15 MILLIGRAM(S): at 01:01

## 2022-01-14 RX ADMIN — Medication 15 MILLIGRAM(S): at 01:25

## 2022-01-14 RX ADMIN — Medication 1000 MILLIGRAM(S): at 09:26

## 2022-01-14 RX ADMIN — Medication 1000 MILLIGRAM(S): at 15:27

## 2022-01-14 RX ADMIN — Medication 1000 MILLIGRAM(S): at 03:16

## 2022-01-14 RX ADMIN — Medication 1000 MILLIGRAM(S): at 03:45

## 2022-01-14 RX ADMIN — HEPARIN SODIUM 5000 UNIT(S): 5000 INJECTION INTRAVENOUS; SUBCUTANEOUS at 21:29

## 2022-01-14 RX ADMIN — Medication 1000 MILLIGRAM(S): at 16:00

## 2022-01-14 NOTE — DISCHARGE NOTE NURSING/CASE MANAGEMENT/SOCIAL WORK - PATIENT PORTAL LINK FT
You can access the FollowMyHealth Patient Portal offered by [facility] by registering at the following website: [email or web address]. By joining [facility]’s FollowMyHealth portal, you will also be able to view your health information using other applications (apps) compatible with our system.

## 2022-01-14 NOTE — PROGRESS NOTE ADULT - SUBJECTIVE AND OBJECTIVE BOX
Procedure: Robotic assisted left hemicolectomy  Surgeon: Dr. Reilly    S: Patient seen and examined at bedside in PACU.  Pt was not easily arousable, likely secondary to anesthesia, but he was able to respond to commands appropriately. Vital signs are stable, will continue to monitor closely.      O:  T(C): 36.3 (01-12-22 @ 12:07), Max: 36.3 (01-12-22 @ 12:07)  T(F): 97.4 (01-12-22 @ 12:07), Max: 97.4 (01-12-22 @ 12:07)  HR: 84 (01-12-22 @ 14:22) (81 - 95)  BP: 117/67 (01-12-22 @ 14:22) (107/58 - 127/68)  RR: 11 (01-12-22 @ 14:22) (10 - 20)  SpO2: 100% (01-12-22 @ 14:22) (91% - 100%)  Wt(kg): --        Gen: NAD, resting comfortably in bed  C/V: NSR  Pulm: Nonlabored breathing, no respiratory distress  Abd: soft, non-tender, nondistended, no guarding, no rebound, all incisions clean, dry, and intact  Extrem: WWP, no calf edema, SCDs in place, no calf tenderness      A/P: 81yMale s/p robotic assisted left hemicolectomy  Diet: tolerating CLD  IVF: LR @ 40mL/hr  Pain/nausea control: tylenol for moderate pain, dilaudid for severe pain, zofran for nausea  DVT ppx: SCDs, SQH, ooba  Vicente- remove POD 1
STATUS POST:  robotic left hemicolectomy    POST OPERATIVE DAY #: 1    SUBJECTIVE:  Seen and examined at bedside this morning, he feels well; his pain is well-controlled. Admits to 3 small episodes of emesis post operatively latest at 8pm, no nausea or vomiting since. No flatus, no BMs.  Complains of mild sore throat, prescribed cepacol.      MEDICATIONS  (STANDING):  acetaminophen     Tablet .. 1000 milliGRAM(s) Oral every 6 hours  BUpivacaine liposome 1.3% Injectable (no eMAR) 20 milliLiter(s) Local Injection once  dextrose 40% Gel 15 Gram(s) Oral once  dextrose 5%. 1000 milliLiter(s) (50 mL/Hr) IV Continuous <Continuous>  dextrose 5%. 1000 milliLiter(s) (100 mL/Hr) IV Continuous <Continuous>  dextrose 50% Injectable 25 Gram(s) IV Push once  dextrose 50% Injectable 12.5 Gram(s) IV Push once  dextrose 50% Injectable 25 Gram(s) IV Push once  glucagon  Injectable 1 milliGRAM(s) IntraMuscular once  heparin   Injectable 5000 Unit(s) SubCutaneous every 8 hours  insulin lispro (ADMELOG) corrective regimen sliding scale   SubCutaneous Before meals and at bedtime  ketorolac   Injectable 15 milliGRAM(s) IV Push every 6 hours  lactated ringers. 1000 milliLiter(s) (40 mL/Hr) IV Continuous <Continuous>  metoprolol succinate ER 25 milliGRAM(s) Oral daily    MEDICATIONS  (PRN):  benzocaine 15 mG/menthol 3.6 mG (Sugar-Free) Lozenge 1 Lozenge Oral two times a day PRN Sore Throat  HYDROmorphone  Injectable 0.5 milliGRAM(s) IV Push every 4 hours PRN Severe Pain (7 - 10)  ondansetron Injectable 4 milliGRAM(s) IV Push every 6 hours PRN Nausea and/or Vomiting      Vital Signs Last 24 Hrs  T(C): 36.9 (13 Jan 2022 04:56), Max: 37.1 (12 Jan 2022 23:25)  T(F): 98.4 (13 Jan 2022 04:56), Max: 98.7 (12 Jan 2022 23:25)  HR: 80 (13 Jan 2022 06:08) (80 - 95)  BP: 111/56 (13 Jan 2022 06:08) (107/54 - 135/64)  BP(mean): 92 (12 Jan 2022 16:22) (76 - 92)  RR: 18 (13 Jan 2022 06:08) (10 - 20)  SpO2: 96% (13 Jan 2022 06:08) (91% - 100%)    PHYSICAL EXAM:    Constitutional: A&Ox3    Respiratory: non labored breathing, no respiratory distress    Cardiovascular: NSR, RRR    Gastrointestinal: Abdomen soft, non-tender, minimal distention,                  Incision: clean, dry, intact    Genitourinary: miller to gravity    Extremities: (-) edema, SCDs                  I&O's Detail    12 Jan 2022 07:01  -  13 Jan 2022 07:00  --------------------------------------------------------  IN:    Lactated Ringers: 760 mL  Total IN: 760 mL    OUT:    Emesis (mL): 200 mL    Indwelling Catheter - Urethral (mL): 1760 mL  Total OUT: 1960 mL    Total NET: -1200 mL      13 Jan 2022 07:01  -  13 Jan 2022 07:46  --------------------------------------------------------  IN:  Total IN: 0 mL    OUT:    Indwelling Catheter - Urethral (mL): 200 mL  Total OUT: 200 mL    Total NET: -200 mL          LABS:                        11.1   9.81  )-----------( 225      ( 13 Jan 2022 06:02 )             32.7     01-13    138  |  104  |  13  ----------------------------<  108<H>  3.5   |  26  |  0.85    Ca    8.3<L>      13 Jan 2022 06:02  Phos  3.6     01-13  Mg     2.2     01-13      PT/INR - ( 12 Jan 2022 07:25 )   PT: 12.4 sec;   INR: 1.04          PTT - ( 12 Jan 2022 07:25 )  PTT:33.2 sec      RADIOLOGY & ADDITIONAL STUDIES:
SUBJECTIVE:   Patient seen and evaluated. Patient says that he is passing flatus as well as producing bowel movements. He denies any nausea or emesis. Patient endorses mild abdominal soreness with coughing.     heparin   Injectable 5000 Unit(s) SubCutaneous every 8 hours  metoprolol succinate ER 25 milliGRAM(s) Oral daily      Vital Signs Last 24 Hrs  T(C): 36.4 (14 Jan 2022 04:59), Max: 37.1 (13 Jan 2022 12:59)  T(F): 97.5 (14 Jan 2022 04:59), Max: 98.8 (13 Jan 2022 12:59)  HR: 77 (14 Jan 2022 04:59) (72 - 78)  BP: 114/65 (14 Jan 2022 04:59) (114/65 - 124/72)  BP(mean): --  RR: 18 (14 Jan 2022 04:59) (17 - 18)  SpO2: 93% (14 Jan 2022 04:59) (93% - 97%)  I&O's Detail    12 Jan 2022 07:01  -  13 Jan 2022 07:00  --------------------------------------------------------  IN:    Lactated Ringers: 760 mL  Total IN: 760 mL    OUT:    Emesis (mL): 200 mL    Indwelling Catheter - Urethral (mL): 1760 mL  Total OUT: 1960 mL    Total NET: -1200 mL      13 Jan 2022 07:01  -  14 Jan 2022 06:56  --------------------------------------------------------  IN:    Lactated Ringers: 600 mL    Oral Fluid: 1214 mL  Total IN: 1814 mL    OUT:    Indwelling Catheter - Urethral (mL): 200 mL    Voided (mL): 1850 mL  Total OUT: 2050 mL    Total NET: -236 mL          General: NAD, resting comfortably in bed  C/V: Normal rate.   Pulm: Nonlabored breathing, no respiratory distress. Speaking in complete sentences.  Abd: soft, minimal/mild abdominal distention. Surgical incision sites c/d/i; no erythema, purulence, or focal edema; appropriately TTP.   Extrem: WWP, no edema, SCDs in place      LABS:                        10.7   9.34  )-----------( 205      ( 14 Jan 2022 06:29 )             31.8     01-14    140  |  x   |  12  ----------------------------<  x   4.1   |  22  |  0.78    Ca    8.3<L>      13 Jan 2022 06:02  Phos  3.6     01-13  Mg     2.3     01-14      PT/INR - ( 12 Jan 2022 07:25 )   PT: 12.4 sec;   INR: 1.04          PTT - ( 12 Jan 2022 07:25 )  PTT:33.2 sec

## 2022-01-14 NOTE — PROGRESS NOTE ADULT - ASSESSMENT
81 year old Mandarin speaking male with PMH of HLD, DM type 2, arthritis, ANNABELLE (not on CPAP), cardiac arrhythmia, hard of hearing, newly diagnosed colon cancer presents for left hemicolectomy. Patient initially presented with rectal bleeding and underwent colonoscopy on 12/18/21 whic found an infiltrative non-obstructing mass found in the transverse colon, pathology consistent with fragments of colonic mucosa with high grade dysplasia suspicious for underlying adenocarcinoma. Now s/p robotic assisted left hemicolectomy on 1/12.    remove miller today   Pain/nausea control prn  CLD/IVF  ISS  HSQ/SCDs  IS/OOBA  Home metoprolol  AM labs   
81 year old Mandarin speaking male with newly diagnosed transverse colonic adenocarcinoma s/p robotic assisted left hemicolectomy on 1/12. +F/+BM. -N/-V. Mild post-surgical soreness.     Pain/nausea control prn  LRD/IVF  ISS  HSQ/SCDs  IS/OOBA  Home metoprolol  Dispo: discharge today

## 2022-01-14 NOTE — DISCHARGE NOTE NURSING/CASE MANAGEMENT/SOCIAL WORK - NSDCFUADDAPPT_GEN_ALL_CORE_FT
Please follow-up with Dr. Reilly in 2 weeks. Call the office to schedule a follow-up appointment (call at [phone])

## 2022-01-14 NOTE — DISCHARGE NOTE NURSING/CASE MANAGEMENT/SOCIAL WORK - NSDCPEFALRISK_GEN_ALL_CORE
For information on Fall & Injury Prevention, visit: [email or web address] OR  [email or web address] OR  https://www.cdc.gov/steadi/patient.html

## 2022-01-17 LAB — SURGICAL PATHOLOGY STUDY: SIGNIFICANT CHANGE UP

## 2022-01-20 DIAGNOSIS — I49.9 CARDIAC ARRHYTHMIA, UNSPECIFIED: ICD-10-CM

## 2022-01-20 DIAGNOSIS — C18.9 MALIGNANT NEOPLASM OF COLON, UNSPECIFIED: ICD-10-CM

## 2022-01-20 DIAGNOSIS — E78.5 HYPERLIPIDEMIA, UNSPECIFIED: ICD-10-CM

## 2022-01-20 DIAGNOSIS — M19.90 UNSPECIFIED OSTEOARTHRITIS, UNSPECIFIED SITE: ICD-10-CM

## 2022-01-20 DIAGNOSIS — Z97.4 PRESENCE OF EXTERNAL HEARING-AID: ICD-10-CM

## 2022-01-20 DIAGNOSIS — J45.909 UNSPECIFIED ASTHMA, UNCOMPLICATED: ICD-10-CM

## 2022-01-20 DIAGNOSIS — Z79.84 LONG TERM (CURRENT) USE OF ORAL HYPOGLYCEMIC DRUGS: ICD-10-CM

## 2022-01-20 DIAGNOSIS — N40.0 BENIGN PROSTATIC HYPERPLASIA WITHOUT LOWER URINARY TRACT SYMPTOMS: ICD-10-CM

## 2022-01-20 DIAGNOSIS — Z85.46 PERSONAL HISTORY OF MALIGNANT NEOPLASM OF PROSTATE: ICD-10-CM

## 2022-01-20 DIAGNOSIS — E11.9 TYPE 2 DIABETES MELLITUS WITHOUT COMPLICATIONS: ICD-10-CM

## 2022-01-20 DIAGNOSIS — G47.33 OBSTRUCTIVE SLEEP APNEA (ADULT) (PEDIATRIC): ICD-10-CM

## 2022-01-24 RX ORDER — METOPROLOL TARTRATE 50 MG
1 TABLET ORAL
Qty: 0 | Refills: 0 | DISCHARGE

## 2022-02-04 ENCOUNTER — APPOINTMENT (OUTPATIENT)
Dept: COLORECTAL SURGERY | Facility: CLINIC | Age: 82
End: 2022-02-04
Payer: MEDICARE

## 2022-02-04 VITALS
HEIGHT: 63 IN | HEART RATE: 89 BPM | DIASTOLIC BLOOD PRESSURE: 67 MMHG | WEIGHT: 147 LBS | BODY MASS INDEX: 26.05 KG/M2 | TEMPERATURE: 97.8 F | SYSTOLIC BLOOD PRESSURE: 122 MMHG

## 2022-02-04 PROBLEM — E78.5 HYPERLIPIDEMIA, UNSPECIFIED: Chronic | Status: ACTIVE | Noted: 2022-01-11

## 2022-02-04 PROBLEM — E11.9 TYPE 2 DIABETES MELLITUS WITHOUT COMPLICATIONS: Chronic | Status: ACTIVE | Noted: 2022-01-11

## 2022-02-04 PROBLEM — C18.9 MALIGNANT NEOPLASM OF COLON, UNSPECIFIED: Chronic | Status: ACTIVE | Noted: 2022-01-11

## 2022-02-04 PROCEDURE — 99024 POSTOP FOLLOW-UP VISIT: CPT

## 2022-02-04 NOTE — HISTORY OF PRESENT ILLNESS
[FreeTextEntry1] : 80 y/o Mandarin speaking M with newly diagnosed colon cancer s/p Left hemicolectomy. \par PMH of HLD, T2DM, arthritis, sleep apnea (not on CPAP), cardiac arrhythmia (currently under the care of Dr. Brown on Metoprolol), hard of hearing (R ear hearing aid)\par \par s/p robotic assisted left hemicolectomy 1/12/2022. Postoperative course uncomplicated. \par Left colon\par \par Final Diagnosis\par \par Colon, left hemicolectomy:\par - Moderately differentiated adenocarcinoma invading the\par pericolorectal soft tissue\par \par - No lymphovascular invasion identified\par - Sixteen pericolic lymph nodes negative for tumor (0/16)\par - Proximal, distal and mesenteric resection margins negative for\par tumor\par - See synoptic report below\par Verified by: VALDEZ Soares MD, PhD\par (Electronic Signature)\par Reported on: 01/17/22 13:58 EST, [facility], [address] [place] [address],\[place] [address]\par Phone: [phone]   Fax: [phone]\par _________________________________________________________________\par \par \par Comment\par MOL (1F, 1G)\par \par Synoptic Summary\par \par SPECIMEN\par Procedure:  Left hemicolectomy\par Macroscopic Evaluation of Mesorectum:   Not applicable\par TUMOR\par Tumor Site: Descending colon\par Histologic Type:  Adenocarcinoma\par Histologic Grade:  G2, moderately differentiated\par Tumor Size: Greatest dimension (Centimeters) -   2.5 x  2.4 x 0.8 cm\par Multiple Primary Sites:  Not applicable\par Tumor Extent:  Invades through muscularis propria into pericolorectal\par tissue\par Macroscopic Tumor Perforation:   Not identified\par Lymphovascular Invasion:  Not identified\par Perineural Invasion:  Not identified\par Treatment Effect:  No known presurgical therapy\par MARGINS\par Margin Status for Invasive Carcinoma:   All margins negative for invasive\par carcinoma\par Distance from Invasive Carcinoma to Radial (Circumferential)\par Margin:  45 mm\par Distance from Invasive Carcinoma to Closest Mucosal Margin:    35 mm\par Margin Status for Non-Invasive Tumor:   Not applicable\par REGIONAL LYMPH NODES\par Regional Lymph Node Status:  All regional lymph nodes negative for tumor\par Number of Lymph Nodes Examined:   16\par Tumor Deposits:  Not identified\par DISTANT METASTASIS\par Distant Site(s) Involved: Cannot be determined\par PATHOLOGIC STAGE CLASSIFICATION (pTNM, AJCC 8th Edition)\par Reporting of pT, pN, and (when applicable) pM categories is based\par on information available to the pathologist at the time the report\par is issued. As per the AJCC (Chapter 1, 8th Ed.) it is the managing\par \par physician's responsibility to establish the final pathologic stage based\par upon all pertinent information, including but potentially not limited to\par this pathology report.\par TNM Descriptors: Not applicable\par pT Category:  pT3\par pN Category:  pN0\par pM Category:  Not applicable - pM cannot be determined from the\par submitted specimen(s)\par \par \par \par \par h/o prostate CA, s/p radiation treatment x 48 at [facility] followed by ?orchiectomy surgery 2009 in [place] as patient was told cancer had spread. Denies h/o chemo. PCP monitors PSA and otherwise normal per patient\par \par Non con CT Chest completed 10/1/2022\par IMPRESSION:\par No definite hematogenous metastatic pattern however there is a 7 mm solid nodule within the right lower lobe which may demonstrate central fat attenuation to suggest benign hamartoma. \par Pleural and parenchymal scarring as well as areas of cylindrical and cystic bronchiectasis most postinflammatory. Tubular focus of mucous plugging within the left upper lobe is noted. \par Small type I hiatal hernia. \par Mild mediastinal perihilar lymphadenopathy which may be reactive. \par \par Colonoscopy completed 12/18/21 with Dr. Allen for rectal bleeding and lower abdominal pain\par - infiltrative non-obstructing mass found in the transverse colon. Mass was partially circumferential and measured 4 cm. Biopsied and tattooed proximal to the lesion\par - small internal hemorrhoids\par \par Pathology:\par Fragments of colonic mucosa w/ HGD, suspicious for underlying invasive adenocarcinoma\par Scheduled for CT a/p w/ contrast at [facility] in [place] on 12/27/21\par \par Last colonoscopy completed 12/2018, TVA removed\par h/o laterally spreading granular polyp measuring 2 cm in transverse colon on 12/2018 colonoscopy, biopsy c/w TVA, s/p EMR at [facility] 1/2019 for removal of polyp w/ negative pathology as per GI notes\par \par \par \par FMH: Mother (+) colon cancer diagnosed this year in her 90's (likely palliative care, has HHA and lives with patient)\par ASA/NSAIDs use in the last 7 days.

## 2022-02-04 NOTE — PHYSICAL EXAM
[Abdomen Masses] : No abdominal masses [Abdomen Tenderness] : ~T No ~M abdominal tenderness [No HSM] : no hepatosplenomegaly [JVD] : no jugular venous distention  [Normal Breath Sounds] : Normal breath sounds [Normal Heart Sounds] : normal heart sounds [No Rash or Lesion] : No rash or lesion [Calm] : calm [de-identified] : Abdomen is soft, nontender, nondistended. Incisions are well healed. No hernia or masses\par

## 2022-02-04 NOTE — ASSESSMENT
[FreeTextEntry1] : Stage II colon cancer.  Status post robotic left hemicolectomy.\par \par Recovering well.\par \par Advised increasing diet and activity.\par \par Recommend continued surveillance with CBC, SMA-20 and CEA every 6 month.  Yearly CT scan.  Recommend return to GI in 1 year for surveillance colonoscopy.\par \par

## 2022-02-15 PROCEDURE — C9399: CPT

## 2022-02-15 PROCEDURE — C1889: CPT

## 2022-02-15 PROCEDURE — 86900 BLOOD TYPING SEROLOGIC ABO: CPT

## 2022-02-15 PROCEDURE — 88341 IMHCHEM/IMCYTCHM EA ADD ANTB: CPT

## 2022-02-15 PROCEDURE — 86901 BLOOD TYPING SEROLOGIC RH(D): CPT

## 2022-02-15 PROCEDURE — 36415 COLL VENOUS BLD VENIPUNCTURE: CPT

## 2022-02-15 PROCEDURE — 83735 ASSAY OF MAGNESIUM: CPT

## 2022-02-15 PROCEDURE — S2900: CPT

## 2022-02-15 PROCEDURE — 85730 THROMBOPLASTIN TIME PARTIAL: CPT

## 2022-02-15 PROCEDURE — 85027 COMPLETE CBC AUTOMATED: CPT

## 2022-02-15 PROCEDURE — 88342 IMHCHEM/IMCYTCHM 1ST ANTB: CPT

## 2022-02-15 PROCEDURE — 80048 BASIC METABOLIC PNL TOTAL CA: CPT

## 2022-02-15 PROCEDURE — 85610 PROTHROMBIN TIME: CPT

## 2022-02-15 PROCEDURE — 82962 GLUCOSE BLOOD TEST: CPT

## 2022-02-15 PROCEDURE — 88309 TISSUE EXAM BY PATHOLOGIST: CPT

## 2022-02-15 PROCEDURE — 84100 ASSAY OF PHOSPHORUS: CPT

## 2022-02-15 PROCEDURE — 83036 HEMOGLOBIN GLYCOSYLATED A1C: CPT

## 2022-02-15 PROCEDURE — 86850 RBC ANTIBODY SCREEN: CPT

## 2022-02-16 ENCOUNTER — NON-APPOINTMENT (OUTPATIENT)
Age: 82
End: 2022-02-16

## 2022-05-23 ENCOUNTER — LABORATORY RESULT (OUTPATIENT)
Age: 82
End: 2022-05-23

## 2022-05-23 ENCOUNTER — APPOINTMENT (OUTPATIENT)
Dept: OTOLARYNGOLOGY | Facility: CLINIC | Age: 82
End: 2022-05-23
Payer: MEDICARE

## 2022-05-23 VITALS
DIASTOLIC BLOOD PRESSURE: 72 MMHG | HEART RATE: 85 BPM | SYSTOLIC BLOOD PRESSURE: 154 MMHG | BODY MASS INDEX: 26.05 KG/M2 | HEIGHT: 63 IN | WEIGHT: 147 LBS

## 2022-05-23 PROCEDURE — 99204 OFFICE O/P NEW MOD 45 MIN: CPT

## 2022-05-23 PROCEDURE — 92567 TYMPANOMETRY: CPT

## 2022-05-23 PROCEDURE — 92557 COMPREHENSIVE HEARING TEST: CPT

## 2022-05-24 ENCOUNTER — NON-APPOINTMENT (OUTPATIENT)
Age: 82
End: 2022-05-24

## 2022-06-03 LAB
ACE BLD-CCNC: 29 U/L
ANION GAP SERPL CALC-SCNC: 20 MMOL/L
BUN SERPL-MCNC: 14 MG/DL
C3 SERPL-MCNC: 139 MG/DL
C4 SERPL-MCNC: 22 MG/DL
CALCIUM SERPL-MCNC: 10.3 MG/DL
CHLORIDE SERPL-SCNC: 100 MMOL/L
CO2 SERPL-SCNC: 22 MMOL/L
CREAT SERPL-MCNC: 0.85 MG/DL
CRP SERPL-MCNC: <3 MG/L
EGFR: 88 ML/MIN/1.73M2
ERYTHROCYTE [SEDIMENTATION RATE] IN BLOOD BY WESTERGREN METHOD: 24 MM/HR
ESTIMATED AVERAGE GLUCOSE: 140 MG/DL
GLUCOSE SERPL-MCNC: 73 MG/DL
HBA1C MFR BLD HPLC: 6.5 %
POTASSIUM SERPL-SCNC: 4.5 MMOL/L
RHEUMATOID FACT SER QL: 10 IU/ML
SODIUM SERPL-SCNC: 142 MMOL/L
THYROGLOB AB SERPL-ACNC: <20 IU/ML
THYROPEROXIDASE AB SERPL IA-ACNC: 51.4 IU/ML

## 2022-06-13 ENCOUNTER — APPOINTMENT (OUTPATIENT)
Dept: OTOLARYNGOLOGY | Facility: CLINIC | Age: 82
End: 2022-06-13
Payer: MEDICARE

## 2022-06-13 DIAGNOSIS — E11.9 TYPE 2 DIABETES MELLITUS W/OUT COMPLICATIONS: ICD-10-CM

## 2022-06-13 PROCEDURE — 92557 COMPREHENSIVE HEARING TEST: CPT

## 2022-06-13 PROCEDURE — 92567 TYMPANOMETRY: CPT

## 2022-06-13 PROCEDURE — 99214 OFFICE O/P EST MOD 30 MIN: CPT

## 2022-06-13 NOTE — HISTORY OF PRESENT ILLNESS
[de-identified] : 82 year old male presents for initial consultation for hearing loss. Daughter reports right ear-recent hearing loss and left ear 20 year history of hearing. Reports using right ear HA-(over 20 years). . Patient denies otalgia, otorrhea, ear infections, tinnitus, dizziness, vertigo, headaches related to hearing.  Had IT steroid - helped a little -  [id number] - had CT and MRI - [address] [place] - report said normal - Hearing loss AD started June 2006 started using hearing aid - this Feb developed worse hearing AD - woke up and heard echo - no vertigo - had a total of 5 IT injections - most recent May 3rd - no use of oral steroids - diabetes  present but no medical treatment - A1C 6.4

## 2022-06-23 PROBLEM — E11.9 DIABETES: Status: ACTIVE | Noted: 2021-12-27

## 2022-06-23 NOTE — DATA REVIEWED
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

## 2022-06-23 NOTE — CONSULT LETTER
[Dear  ___] : Dear ~DOROTHY, [Consult Letter:] : I had the pleasure of evaluating your patient, [unfilled]. [Please see my note below.] : Please see my note below. [Consult Closing:] : Thank you very much for allowing me to participate in the care of this patient.  If you have any questions, please do not hesitate to contact me. [Sincerely,] : Sincerely, [FreeTextEntry2] : Brenda Sheridan MD [FreeTextEntry3] : Daquan Kat MD, FACS\par Chair of Otolaryngology, [facility]\par Professor of Otolaryngology & Molecular Medicine, [employer] [facility] [employer] [facility] [employer] at [facility]\par \par

## 2022-06-23 NOTE — HISTORY OF PRESENT ILLNESS
[de-identified] : Patient s/p prednsione- feels a little bit better - blood sugar on prednisone - no sfx - CRP not elevated so probably would not benefit from anakinra - 50 years ago lost hearing AS - tried hearing aid AS but caused worsening tinnitus so never used ( [id number])  - tinnitus has been gone for a long period - recently started 2 days ago then stopped.

## 2022-07-07 ENCOUNTER — APPOINTMENT (OUTPATIENT)
Dept: OTOLARYNGOLOGY | Facility: CLINIC | Age: 82
End: 2022-07-07

## 2022-07-07 VITALS
BODY MASS INDEX: 25.69 KG/M2 | HEART RATE: 83 BPM | DIASTOLIC BLOOD PRESSURE: 63 MMHG | SYSTOLIC BLOOD PRESSURE: 122 MMHG | WEIGHT: 145 LBS | HEIGHT: 63 IN

## 2022-07-07 PROCEDURE — 99214 OFFICE O/P EST MOD 30 MIN: CPT

## 2022-07-07 PROCEDURE — 92567 TYMPANOMETRY: CPT

## 2022-07-07 PROCEDURE — 92557 COMPREHENSIVE HEARING TEST: CPT

## 2022-07-07 RX ORDER — ONDANSETRON 8 MG/1
8 TABLET ORAL
Qty: 30 | Refills: 0 | Status: DISCONTINUED | COMMUNITY
Start: 2022-05-24

## 2022-07-07 RX ORDER — LOPERAMIDE HYDROCHLORIDE 2 MG/1
2 CAPSULE ORAL
Qty: 56 | Refills: 0 | Status: ACTIVE | COMMUNITY
Start: 2022-03-15

## 2022-07-07 RX ORDER — METFORMIN HYDROCHLORIDE 500 MG/1
500 TABLET, COATED ORAL
Qty: 30 | Refills: 0 | Status: ACTIVE | COMMUNITY
Start: 2022-06-27

## 2022-07-07 RX ORDER — ATORVASTATIN CALCIUM 20 MG/1
20 TABLET, FILM COATED ORAL
Qty: 30 | Refills: 0 | Status: ACTIVE | COMMUNITY
Start: 2022-06-06

## 2022-07-07 RX ORDER — PREDNISONE 10 MG/1
10 TABLET ORAL
Qty: 119 | Refills: 0 | Status: DISCONTINUED | COMMUNITY
Start: 2022-05-24 | End: 2022-07-07

## 2022-07-07 RX ORDER — OXYCODONE AND ACETAMINOPHEN 5; 325 MG/1; MG/1
5-325 TABLET ORAL
Qty: 20 | Refills: 0 | Status: DISCONTINUED | COMMUNITY
Start: 2022-01-14

## 2022-07-07 RX ORDER — ICOSAPENT ETHYL 1000 MG/1
1 CAPSULE ORAL
Qty: 120 | Refills: 0 | Status: ACTIVE | COMMUNITY
Start: 2022-06-06

## 2022-07-07 RX ORDER — DICLOFENAC EPOLAMINE 0.01 G/1
1.3 SYSTEM TOPICAL
Qty: 60 | Refills: 0 | Status: ACTIVE | COMMUNITY
Start: 2022-04-16

## 2022-07-07 RX ORDER — MESALAMINE 4 G/60 ML
4 KIT RECTAL
Qty: 1 | Refills: 0 | Status: ACTIVE | COMMUNITY
Start: 2022-06-24

## 2022-07-07 RX ORDER — CAPECITABINE 500 MG/1
500 TABLET ORAL
Qty: 120 | Refills: 0 | Status: DISCONTINUED | COMMUNITY
Start: 2022-02-28

## 2022-07-08 NOTE — HISTORY OF PRESENT ILLNESS
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 Wears hearing aid on the right regularly. No tinnitus, otalgia, otorrhea, ear infections, dizziness or headaches. Needs right cochlear implant - left deafened too long and no hearing aid use  -  [id number] -

## 2022-07-08 NOTE — REASON FOR VISIT
[Spouse] : spouse [Subsequent Evaluation] : a subsequent evaluation for [Hearing Loss] : hearing loss [Interpreters_IDNumber] : [id number] [Interpreters_FullName] : Wing Edmonds [FreeTextEntry3] : javier [TWNoteComboBox1] : Other

## 2022-07-15 ENCOUNTER — APPOINTMENT (OUTPATIENT)
Dept: CT IMAGING | Facility: IMAGING CENTER | Age: 82
End: 2022-07-15

## 2022-07-15 ENCOUNTER — OUTPATIENT (OUTPATIENT)
Dept: OUTPATIENT SERVICES | Facility: HOSPITAL | Age: 82
LOS: 1 days | End: 2022-07-15
Payer: COMMERCIAL

## 2022-07-15 DIAGNOSIS — Z98.41 CATARACT EXTRACTION STATUS, RIGHT EYE: Chronic | ICD-10-CM

## 2022-07-15 DIAGNOSIS — Z90.79 ACQUIRED ABSENCE OF OTHER GENITAL ORGAN(S): Chronic | ICD-10-CM

## 2022-07-15 DIAGNOSIS — H90.3 SENSORINEURAL HEARING LOSS, BILATERAL: ICD-10-CM

## 2022-07-15 DIAGNOSIS — Z41.9 ENCOUNTER FOR PROCEDURE FOR PURPOSES OTHER THAN REMEDYING HEALTH STATE, UNSPECIFIED: Chronic | ICD-10-CM

## 2022-07-15 PROCEDURE — 70480 CT ORBIT/EAR/FOSSA W/O DYE: CPT | Mod: 26

## 2022-07-15 PROCEDURE — 70480 CT ORBIT/EAR/FOSSA W/O DYE: CPT

## 2022-08-09 ENCOUNTER — APPOINTMENT (OUTPATIENT)
Dept: OTOLARYNGOLOGY | Facility: CLINIC | Age: 82
End: 2022-08-09

## 2022-08-09 VITALS
DIASTOLIC BLOOD PRESSURE: 72 MMHG | WEIGHT: 148 LBS | HEIGHT: 63 IN | SYSTOLIC BLOOD PRESSURE: 120 MMHG | BODY MASS INDEX: 26.22 KG/M2 | HEART RATE: 79 BPM

## 2022-08-09 PROCEDURE — 99214 OFFICE O/P EST MOD 30 MIN: CPT

## 2022-08-09 RX ORDER — NAPROXEN AND ESOMEPRAZOLE MAGNESIUM 375; 20 MG/1; MG/1
375-20 TABLET, DELAYED RELEASE ORAL
Qty: 60 | Refills: 0 | Status: ACTIVE | COMMUNITY
Start: 2022-03-25

## 2022-08-09 RX ORDER — AMMONIUM LACTATE 12 %
12 CREAM (GRAM) TOPICAL
Qty: 280 | Refills: 0 | Status: ACTIVE | COMMUNITY
Start: 2022-07-20

## 2022-08-09 RX ORDER — AMMONIUM LACTATE 12 %
12 CREAM (GRAM) TOPICAL
Qty: 280 | Refills: 0 | Status: COMPLETED | COMMUNITY
Start: 2022-07-20

## 2022-08-09 RX ORDER — SODIUM SULFATE, POTASSIUM SULFATE, MAGNESIUM SULFATE 17.5; 3.13; 1.6 G/ML; G/ML; G/ML
17.5-3.13-1.6 SOLUTION, CONCENTRATE ORAL
Qty: 354 | Refills: 0 | Status: COMPLETED | COMMUNITY
Start: 2022-05-21

## 2022-08-09 RX ORDER — METOPROLOL SUCCINATE 25 MG/1
25 TABLET, EXTENDED RELEASE ORAL
Qty: 30 | Refills: 0 | Status: ACTIVE | COMMUNITY
Start: 2022-07-28

## 2022-08-09 NOTE — PHYSICAL EXAM
[Midline] : trachea located in midline position [Normal] : no rashes [de-identified] : left central TM perf (40%); right TM normal

## 2022-08-09 NOTE — HISTORY OF PRESENT ILLNESS
[de-identified] : 82 year old male here for follow up for progressive SNHL. s/p IT steroid injection x1 in right ear with previous ENT and oral steroids (May 2022). Son reports hearing has gotten worse since last visit. States hearing improved temporarily after oral steroids but is back to baseline. Patient continues to wear bilateral HAs consistently. Denies tinnitus, otalgia \par CT completed 7/15/2022.  - son present and translated - has not used left hearing aid in >20 years

## 2022-09-12 ENCOUNTER — NON-APPOINTMENT (OUTPATIENT)
Age: 82
End: 2022-09-12

## 2022-09-12 ENCOUNTER — APPOINTMENT (OUTPATIENT)
Dept: SPEECH THERAPY | Facility: CLINIC | Age: 82
End: 2022-09-12

## 2022-09-12 ENCOUNTER — OUTPATIENT (OUTPATIENT)
Dept: OUTPATIENT SERVICES | Facility: HOSPITAL | Age: 82
LOS: 1 days | Discharge: HOME | End: 2022-09-12

## 2022-09-12 DIAGNOSIS — Z98.41 CATARACT EXTRACTION STATUS, RIGHT EYE: Chronic | ICD-10-CM

## 2022-09-12 DIAGNOSIS — Z41.9 ENCOUNTER FOR PROCEDURE FOR PURPOSES OTHER THAN REMEDYING HEALTH STATE, UNSPECIFIED: Chronic | ICD-10-CM

## 2022-09-12 DIAGNOSIS — Z90.79 ACQUIRED ABSENCE OF OTHER GENITAL ORGAN(S): Chronic | ICD-10-CM

## 2022-09-13 DIAGNOSIS — H90.3 SENSORINEURAL HEARING LOSS, BILATERAL: ICD-10-CM

## 2022-09-15 ENCOUNTER — NON-APPOINTMENT (OUTPATIENT)
Age: 82
End: 2022-09-15

## 2022-10-05 ENCOUNTER — OUTPATIENT (OUTPATIENT)
Dept: OUTPATIENT SERVICES | Facility: HOSPITAL | Age: 82
LOS: 1 days | End: 2022-10-05

## 2022-10-05 VITALS
OXYGEN SATURATION: 99 % | HEIGHT: 62.5 IN | SYSTOLIC BLOOD PRESSURE: 140 MMHG | WEIGHT: 149.91 LBS | DIASTOLIC BLOOD PRESSURE: 70 MMHG | HEART RATE: 80 BPM | TEMPERATURE: 97 F | RESPIRATION RATE: 16 BRPM

## 2022-10-05 DIAGNOSIS — H90.3 SENSORINEURAL HEARING LOSS, BILATERAL: ICD-10-CM

## 2022-10-05 DIAGNOSIS — Z98.41 CATARACT EXTRACTION STATUS, RIGHT EYE: Chronic | ICD-10-CM

## 2022-10-05 DIAGNOSIS — I10 ESSENTIAL (PRIMARY) HYPERTENSION: ICD-10-CM

## 2022-10-05 DIAGNOSIS — Z90.49 ACQUIRED ABSENCE OF OTHER SPECIFIED PARTS OF DIGESTIVE TRACT: Chronic | ICD-10-CM

## 2022-10-05 DIAGNOSIS — Z41.9 ENCOUNTER FOR PROCEDURE FOR PURPOSES OTHER THAN REMEDYING HEALTH STATE, UNSPECIFIED: Chronic | ICD-10-CM

## 2022-10-05 DIAGNOSIS — Z90.79 ACQUIRED ABSENCE OF OTHER GENITAL ORGAN(S): Chronic | ICD-10-CM

## 2022-10-05 DIAGNOSIS — G47.33 OBSTRUCTIVE SLEEP APNEA (ADULT) (PEDIATRIC): ICD-10-CM

## 2022-10-05 DIAGNOSIS — E11.9 TYPE 2 DIABETES MELLITUS WITHOUT COMPLICATIONS: ICD-10-CM

## 2022-10-05 LAB
A1C WITH ESTIMATED AVERAGE GLUCOSE RESULT: 6.6 % — HIGH (ref 4–5.6)
ALBUMIN SERPL ELPH-MCNC: 4.2 G/DL — SIGNIFICANT CHANGE UP (ref 3.3–5)
ALP SERPL-CCNC: 84 U/L — SIGNIFICANT CHANGE UP (ref 40–120)
ALT FLD-CCNC: 35 U/L — SIGNIFICANT CHANGE UP (ref 4–41)
ANION GAP SERPL CALC-SCNC: 12 MMOL/L — SIGNIFICANT CHANGE UP (ref 7–14)
AST SERPL-CCNC: 33 U/L — SIGNIFICANT CHANGE UP (ref 4–40)
BILIRUB SERPL-MCNC: 0.3 MG/DL — SIGNIFICANT CHANGE UP (ref 0.2–1.2)
BUN SERPL-MCNC: 18 MG/DL — SIGNIFICANT CHANGE UP (ref 7–23)
CALCIUM SERPL-MCNC: 9.1 MG/DL — SIGNIFICANT CHANGE UP (ref 8.4–10.5)
CHLORIDE SERPL-SCNC: 102 MMOL/L — SIGNIFICANT CHANGE UP (ref 98–107)
CO2 SERPL-SCNC: 26 MMOL/L — SIGNIFICANT CHANGE UP (ref 22–31)
CREAT SERPL-MCNC: 0.75 MG/DL — SIGNIFICANT CHANGE UP (ref 0.5–1.3)
EGFR: 91 ML/MIN/1.73M2 — SIGNIFICANT CHANGE UP
ESTIMATED AVERAGE GLUCOSE: 143 — SIGNIFICANT CHANGE UP
GLUCOSE SERPL-MCNC: 219 MG/DL — HIGH (ref 70–99)
HCT VFR BLD CALC: 38.2 % — LOW (ref 39–50)
HGB BLD-MCNC: 12.7 G/DL — LOW (ref 13–17)
MCHC RBC-ENTMCNC: 30 PG — SIGNIFICANT CHANGE UP (ref 27–34)
MCHC RBC-ENTMCNC: 33.2 GM/DL — SIGNIFICANT CHANGE UP (ref 32–36)
MCV RBC AUTO: 90.3 FL — SIGNIFICANT CHANGE UP (ref 80–100)
NRBC # BLD: 0 /100 WBCS — SIGNIFICANT CHANGE UP (ref 0–0)
NRBC # FLD: 0 K/UL — SIGNIFICANT CHANGE UP (ref 0–0)
PLATELET # BLD AUTO: 276 K/UL — SIGNIFICANT CHANGE UP (ref 150–400)
POTASSIUM SERPL-MCNC: 3.8 MMOL/L — SIGNIFICANT CHANGE UP (ref 3.5–5.3)
POTASSIUM SERPL-SCNC: 3.8 MMOL/L — SIGNIFICANT CHANGE UP (ref 3.5–5.3)
PROT SERPL-MCNC: 7 G/DL — SIGNIFICANT CHANGE UP (ref 6–8.3)
RBC # BLD: 4.23 M/UL — SIGNIFICANT CHANGE UP (ref 4.2–5.8)
RBC # FLD: 13.1 % — SIGNIFICANT CHANGE UP (ref 10.3–14.5)
SODIUM SERPL-SCNC: 140 MMOL/L — SIGNIFICANT CHANGE UP (ref 135–145)
WBC # BLD: 6.18 K/UL — SIGNIFICANT CHANGE UP (ref 3.8–10.5)
WBC # FLD AUTO: 6.18 K/UL — SIGNIFICANT CHANGE UP (ref 3.8–10.5)

## 2022-10-05 PROCEDURE — 93010 ELECTROCARDIOGRAM REPORT: CPT

## 2022-10-05 RX ORDER — ICOSAPENT ETHYL 500 MG/1
2 CAPSULE, LIQUID FILLED ORAL
Qty: 0 | Refills: 0 | DISCHARGE

## 2022-10-05 RX ORDER — CHOLECALCIFEROL (VITAMIN D3) 125 MCG
1 CAPSULE ORAL
Qty: 0 | Refills: 0 | DISCHARGE

## 2022-10-05 RX ORDER — SODIUM CHLORIDE 9 MG/ML
1000 INJECTION, SOLUTION INTRAVENOUS
Refills: 0 | Status: DISCONTINUED | OUTPATIENT
Start: 2022-10-20 | End: 2022-11-03

## 2022-10-05 RX ORDER — ATORVASTATIN CALCIUM 80 MG/1
1 TABLET, FILM COATED ORAL
Qty: 0 | Refills: 0 | DISCHARGE

## 2022-10-05 RX ORDER — ICOSAPENT ETHYL 500 MG/1
1 CAPSULE, LIQUID FILLED ORAL
Qty: 0 | Refills: 0 | DISCHARGE

## 2022-10-05 RX ORDER — METFORMIN HYDROCHLORIDE 850 MG/1
1 TABLET ORAL
Qty: 0 | Refills: 0 | DISCHARGE

## 2022-10-05 RX ORDER — METFORMIN HYDROCHLORIDE 850 MG/1
0 TABLET ORAL
Qty: 0 | Refills: 0 | DISCHARGE

## 2022-10-05 RX ORDER — METOPROLOL TARTRATE 50 MG
1 TABLET ORAL
Qty: 0 | Refills: 0 | DISCHARGE

## 2022-10-05 RX ORDER — ASCORBIC ACID 60 MG
50 TABLET,CHEWABLE ORAL
Qty: 0 | Refills: 0 | DISCHARGE

## 2022-10-05 RX ORDER — ASPIRIN/CALCIUM CARB/MAGNESIUM 324 MG
1 TABLET ORAL
Qty: 0 | Refills: 0 | DISCHARGE

## 2022-10-05 NOTE — H&P PST ADULT - NSICDXPASTMEDICALHX_GEN_ALL_CORE_FT
PAST MEDICAL HISTORY:  Benign prostatic hypertrophy     Colon cancer     DM (diabetes mellitus)     HLD (hyperlipidemia)     HTN (hypertension)     ANNABELLE (obstructive sleep apnea)     Prostate cancer s/p XRT, bilateral orchiectomies [id number]

## 2022-10-05 NOTE — H&P PST ADULT - HISTORY OF PRESENT ILLNESS
82 year old male with PMH of HTN, HLD, Type 2 DM, ANNABELLE denies use of CPAP, prostate and colon cancer, presents to Presurgical testing with diagnosis of  sensorineural hearing loss bilateral scheduled for right cochlear implant with left myringoplasty with graft.

## 2022-10-05 NOTE — H&P PST ADULT - OTHER CARE PROVIDERS
Dr Shaylee Tan (cardiology) [phone]                                                              Dr Alon Gong (Oncologist/ hematologist) [phone]

## 2022-10-05 NOTE — H&P PST ADULT - PROBLEM SELECTOR PLAN 2
Patient instructed to take Metroprolol with a sip of water on the morning of procedure. Patient/son verbalized understanding.  last echo and stress test results requested

## 2022-10-05 NOTE — H&P PST ADULT - NSICDXNOFAMILYHX_GEN_ALL_CORE
[facility]  INFECTIOUS DISEASES   [address] [place] [address]  Tel: [phone]     Fax: [phone]  =======================================================    [id number]  TRIPP ZARATE     Follow up: multilobar pneumonia    Patient seen and examined.   Clinically has improved significantly. No fever, no SOB.  Not on O2, walking in hallway with no O2.     PAST MEDICAL & SURGICAL HISTORY:  HTN (hypertension)  c/b multiple episodes of hypertensive urgency  HLD (hyperlipidemia)  Atrial fibrillation  first documented on EKG 10/7/2021  CAD (coronary artery disease)  s/p stents (not on plavix)  Type 2 diabetes mellitus  not on home insulin  Anxiety  multiple psych medications  History of diverticulitis  07/2021  Diverticulosis  c/b GIB in 2020  Blood clots in brain  Had surgery ( April 2013 )  S/P tonsillectomy  S/P arthroscopic knee surgery  Bilateral ( 2005 )  Torsion of testicle  Had surgery at age 15  Pilonidal cyst  Had surgery ( 1969 )  S/P cataract surgery  Bilateral  H/O hernia repair    Social Hx: no smoking, ETOH or drugs     FAMILY HISTORY:  FHx: throat cancer    No family history of colorectal cancer    Allergies  No Known Allergies    Antibiotics:  piperacillin/tazobactam IVPB.. 3.375 Gram(s) IV Intermittent every 8 hours     REVIEW OF SYSTEMS:  CONSTITUTIONAL:  No Fever or chills  HEENT:  No diplopia or blurred vision.  No sore throat or runny nose.  CARDIOVASCULAR:  No chest pain or SOB.  RESPIRATORY:  No cough, shortness of breath, PND or orthopnea.  GASTROINTESTINAL:  No nausea, vomiting or diarrhea.  GENITOURINARY:  No dysuria, frequency or urgency. No Blood in urine  MUSCULOSKELETAL:  no joint aches, no muscle pain  SKIN:  No change in skin, hair or nails.  NEUROLOGIC:  No paresthesias, fasciculations, seizures or weakness.  PSYCHIATRIC:  No disorder of thought or mood.  ENDOCRINE:  No heat or cold intolerance, polyuria or polydipsia.  HEMATOLOGICAL:  No easy bruising or bleeding.     Physical Exam:  Vital Signs Last 24 Hrs  T(C): 36.7 (12 Oct 2021 11:55), Max: 36.7 (12 Oct 2021 11:55)  T(F): 98.1 (12 Oct 2021 11:55), Max: 98.1 (12 Oct 2021 11:55)  HR: 70 (12 Oct 2021 11:55) (59 - 70)  BP: 156/74 (12 Oct 2021 11:55) (150/65 - 177/81)  BP(mean): --  RR: 18 (12 Oct 2021 11:55) (18 - 19)  SpO2: 93% (12 Oct 2021 11:55) (93% - 97%)  GEN: NAD  HEENT: normocephalic and atraumatic. EOMI. PERRL.    NECK: Supple.  No lymphadenopathy   LUNGS: Coarse crackles, o wheezing or fine rales   HEART: Irregular rate and rhythm  ABDOMEN: Soft, nontender, and nondistended.  Positive bowel sounds.    : No CVA tenderness  EXTREMITIES: 1+ edema.  NEUROLOGIC: grossly intact.  PSYCHIATRIC: Appropriate affect .  SKIN: No ulceration or induration present.    Labs:                        8.0    5.77  )-----------( 248      ( 12 Oct 2021 07:27 )             26.5     10-12    140  |  110<H>  |  39<H>  ----------------------------<  102<H>  3.4<L>   |  23  |  2.80<H>    Ca    8.7      12 Oct 2021 07:27    TPro  6.4  /  Alb  2.7<L>  /  TBili  0.2  /  DBili  x   /  AST  11<L>  /  ALT  18  /  AlkPhos  36<L>  10-12    Culture - Blood (collected 10-07-21 @ 03:57)  Source: .Blood Blood-Peripheral  Final Report (10-12-21 @ 04:00):    No Growth Final    Culture - Blood (collected 10-07-21 @ 03:57)  Source: .Blood Blood-Peripheral  Final Report (10-12-21 @ 04:00):    No Growth Final    Culture - Urine (collected 10-07-21 @ 02:42)  Source: Clean Catch Clean Catch (Midstream)  Final Report (10-08-21 @ 12:49):    <10,000 CFU/mL Normal Urogenital Johnna    WBC Count: 5.77 K/uL (10-12-21 @ 07:27)  WBC Count: 7.04 K/uL (10-11-21 @ 06:48)  WBC Count: 6.70 K/uL (10-10-21 @ 08:56)  WBC Count: 6.07 K/uL (10-09-21 @ 07:10)  WBC Count: 7.35 K/uL (10-08-21 @ 07:58)    Creatinine, Serum: 2.80 mg/dL (10-12-21 @ 07:27)  Creatinine, Serum: 2.90 mg/dL (10-11-21 @ 06:48)  Creatinine, Serum: 3.20 mg/dL (10-10-21 @ 08:57)  Creatinine, Serum: 3.50 mg/dL (10-09-21 @ 07:10)  Creatinine, Serum: 3.10 mg/dL (10-08-21 @ 07:58)    C-Reactive Protein, Serum: 16 mg/L (10-08-21 @ 22:27)    Ferritin, Serum: 28 ng/mL (10-08-21 @ 22:26)    Sedimentation Rate, Erythrocyte: 28 mm/hr (10-08-21 @ 16:57)    Procalcitonin, Serum: 0.24 ng/mL (10-12-21 @ 07:27)  Procalcitonin, Serum: 0.14 ng/mL (10-07-21 @ 04:23)     SARS-CoV-2: NotDetec (10-06-21 @ 21:49)  Rapid RVP Result: NotDetec (10-06-21 @ 21:49)      All imaging and other data have been reviewed.  < from: CT Chest No Cont (10.07.21 @ 00:03) >  IMPRESSION:  Multilobar pneumonia.  Mild cardiomegaly.  No small bowel obstruction or active bowel inflammation.    Assessment and Plan:   76 yo man with PMH of HTN, DM2, CAD, HepC, CKD and afib was admitted from [facility] with subjective fevers, SOB, weakness, and brief episodes of palpitations, and urinary frequency for 3 days.  No leukocytosis or documented fever  CT with Multilobar pneumonia  Procalcitonin 0.14     1- Multilobar pneumonia   - Blood culture x 2 NGTD  - UA and UC negative   - S/p 5days of Zosyn, Can switch to augmentin for 2 more days, adjust for renal function   - Renal function is stable creat 2.9    Will sign off please call with any question.     Suzie Garner MD  Division of Infectious Diseases   Cell [phone] between 8am and 6pm   After 6pm and weekends please call ID service at [phone].        <-- Click to add NO pertinent Family History

## 2022-10-05 NOTE — H&P PST ADULT - PROBLEM SELECTOR PLAN 1
Patient tentatively scheduled for right cochlear implant with left myringoplasty with graft on 10/20/22.  Pre-op instructions provided. Pt given verbal and written instructions with teach back on pepcid. Pt verbalized understanding with return demonstration.   Preop Covid PCR test ordered .Instructions regarding covid PCR test to be obtained 3- 5 days prior to surgery and locations for covid testing site provided. Pt verbalized understanding

## 2022-10-05 NOTE — H&P PST ADULT - NSICDXPASTSURGICALHX_GEN_ALL_CORE_FT
PAST SURGICAL HISTORY:  Elective surgery surgery for prostate cancer in [place]    H/O bilateral orchiectomies 2010 secondary to prostate cancer    H/O colectomy

## 2022-10-05 NOTE — H&P PST ADULT - PRO ARRIVE FROM
Chief Complaint   Patient presents with     Port Flush     Lab drawn via port, line flushed and hep locked. Incorrect lab tube drawn. Correct lab then drawn via .     Blood Draw     Lab drawn via  by JOE.     Chapis Peters RN     home

## 2022-10-10 ENCOUNTER — APPOINTMENT (OUTPATIENT)
Dept: OTOLARYNGOLOGY | Facility: CLINIC | Age: 82
End: 2022-10-10
Payer: MEDICARE

## 2022-10-10 DIAGNOSIS — H91.21 SUDDEN IDIOPATHIC HEARING LOSS, RIGHT EAR: ICD-10-CM

## 2022-10-10 PROCEDURE — 92567 TYMPANOMETRY: CPT

## 2022-10-10 PROCEDURE — 92557 COMPREHENSIVE HEARING TEST: CPT

## 2022-10-10 PROCEDURE — 99214 OFFICE O/P EST MOD 30 MIN: CPT

## 2022-10-17 ENCOUNTER — NON-APPOINTMENT (OUTPATIENT)
Age: 82
End: 2022-10-17

## 2022-10-18 ENCOUNTER — NON-APPOINTMENT (OUTPATIENT)
Age: 82
End: 2022-10-18

## 2022-10-19 ENCOUNTER — TRANSCRIPTION ENCOUNTER (OUTPATIENT)
Age: 82
End: 2022-10-19

## 2022-10-19 NOTE — ASU PATIENT PROFILE, ADULT - FALL HARM RISK - UNIVERSAL INTERVENTIONS
Bed in lowest position, wheels locked, appropriate side rails in place/Call bell, personal items and telephone in reach/Instruct patient to call for assistance before getting out of bed or chair/Non-slip footwear when patient is out of bed/Filer to call system/Physically safe environment - no spills, clutter or unnecessary equipment/Purposeful Proactive Rounding/Room/bathroom lighting operational, light cord in reach

## 2022-10-19 NOTE — ASU PATIENT PROFILE, ADULT - NSICDXPASTMEDICALHX_GEN_ALL_CORE_FT
PAST MEDICAL HISTORY:  Benign prostatic hypertrophy     Colon cancer     DM (diabetes mellitus)     HLD (hyperlipidemia)     HTN (hypertension)     ANNABELLE (obstructive sleep apnea)     Prostate cancer s/p XRT, bilateral orchiectomies [id number]

## 2022-10-20 ENCOUNTER — RESULT REVIEW (OUTPATIENT)
Age: 82
End: 2022-10-20

## 2022-10-20 ENCOUNTER — APPOINTMENT (OUTPATIENT)
Dept: OTOLARYNGOLOGY | Facility: HOSPITAL | Age: 82
End: 2022-10-20

## 2022-10-20 ENCOUNTER — TRANSCRIPTION ENCOUNTER (OUTPATIENT)
Age: 82
End: 2022-10-20

## 2022-10-20 ENCOUNTER — OUTPATIENT (OUTPATIENT)
Dept: OUTPATIENT SERVICES | Facility: HOSPITAL | Age: 82
LOS: 1 days | Discharge: HOME | End: 2022-10-20

## 2022-10-20 ENCOUNTER — APPOINTMENT (OUTPATIENT)
Dept: SPEECH THERAPY | Facility: HOSPITAL | Age: 82
End: 2022-10-20

## 2022-10-20 VITALS
SYSTOLIC BLOOD PRESSURE: 126 MMHG | RESPIRATION RATE: 16 BRPM | HEART RATE: 67 BPM | OXYGEN SATURATION: 97 % | DIASTOLIC BLOOD PRESSURE: 52 MMHG

## 2022-10-20 VITALS
WEIGHT: 149.03 LBS | DIASTOLIC BLOOD PRESSURE: 68 MMHG | RESPIRATION RATE: 16 BRPM | HEART RATE: 72 BPM | HEIGHT: 62 IN | TEMPERATURE: 98 F | OXYGEN SATURATION: 98 % | SYSTOLIC BLOOD PRESSURE: 114 MMHG

## 2022-10-20 DIAGNOSIS — Z41.9 ENCOUNTER FOR PROCEDURE FOR PURPOSES OTHER THAN REMEDYING HEALTH STATE, UNSPECIFIED: Chronic | ICD-10-CM

## 2022-10-20 DIAGNOSIS — H90.3 SENSORINEURAL HEARING LOSS, BILATERAL: ICD-10-CM

## 2022-10-20 DIAGNOSIS — Z90.79 ACQUIRED ABSENCE OF OTHER GENITAL ORGAN(S): Chronic | ICD-10-CM

## 2022-10-20 DIAGNOSIS — Z90.49 ACQUIRED ABSENCE OF OTHER SPECIFIED PARTS OF DIGESTIVE TRACT: Chronic | ICD-10-CM

## 2022-10-20 PROBLEM — I10 ESSENTIAL (PRIMARY) HYPERTENSION: Chronic | Status: ACTIVE | Noted: 2022-10-05

## 2022-10-20 PROBLEM — G47.33 OBSTRUCTIVE SLEEP APNEA (ADULT) (PEDIATRIC): Chronic | Status: ACTIVE | Noted: 2022-10-05

## 2022-10-20 PROBLEM — H91.21 SUDDEN IDIOPATHIC HEARING LOSS OF RIGHT EAR WITH RESTRICTED HEARING OF LEFT EAR: Status: ACTIVE | Noted: 2022-05-23

## 2022-10-20 LAB — GLUCOSE BLDC GLUCOMTR-MCNC: 114 MG/DL — HIGH (ref 70–99)

## 2022-10-20 PROCEDURE — 88305 TISSUE EXAM BY PATHOLOGIST: CPT | Mod: 26

## 2022-10-20 PROCEDURE — 70250 X-RAY EXAM OF SKULL: CPT | Mod: 26

## 2022-10-20 PROCEDURE — 69620 MYRINGOPLASTY: CPT | Mod: LT

## 2022-10-20 PROCEDURE — 92516 FACIAL NERVE FUNCTION TEST: CPT | Mod: RT

## 2022-10-20 PROCEDURE — 88304 TISSUE EXAM BY PATHOLOGIST: CPT | Mod: 26

## 2022-10-20 PROCEDURE — 88311 DECALCIFY TISSUE: CPT | Mod: 26

## 2022-10-20 PROCEDURE — 69930 IMPLANT COCHLEAR DEVICE: CPT | Mod: RT

## 2022-10-20 DEVICE — SCREW CORTEX S-T TI N/S 1.3X4MM: Type: IMPLANTABLE DEVICE | Site: LEFT, RIGHT | Status: FUNCTIONAL

## 2022-10-20 DEVICE — SURGIFOAM PAD 8CM X 12.5CM X 2MM (100C): Type: IMPLANTABLE DEVICE | Site: LEFT, RIGHT | Status: FUNCTIONAL

## 2022-10-20 DEVICE — IMP NUCLEUS PROFILE PLUS W/SLIM STRT ELECT CI622 ORDER MULT: Type: IMPLANTABLE DEVICE | Site: LEFT, RIGHT | Status: FUNCTIONAL

## 2022-10-20 DEVICE — KIT DUAL 1 CP1000 AND 1 CP1150 PROCESSOR: Type: IMPLANTABLE DEVICE | Site: LEFT, RIGHT | Status: FUNCTIONAL

## 2022-10-20 DEVICE — BONE WAX 2.5GM: Type: IMPLANTABLE DEVICE | Site: LEFT, RIGHT | Status: FUNCTIONAL

## 2022-10-20 RX ORDER — OXYCODONE HYDROCHLORIDE 5 MG/1
5 TABLET ORAL EVERY 6 HOURS
Refills: 0 | Status: DISCONTINUED | OUTPATIENT
Start: 2022-10-20 | End: 2022-10-20

## 2022-10-20 RX ORDER — OXYCODONE HYDROCHLORIDE 5 MG/1
5 TABLET ORAL ONCE
Refills: 0 | Status: DISCONTINUED | OUTPATIENT
Start: 2022-10-20 | End: 2022-10-20

## 2022-10-20 RX ORDER — SODIUM CHLORIDE 9 MG/ML
1000 INJECTION INTRAMUSCULAR; INTRAVENOUS; SUBCUTANEOUS
Refills: 0 | Status: DISCONTINUED | OUTPATIENT
Start: 2022-10-20 | End: 2022-11-03

## 2022-10-20 RX ORDER — ACETAMINOPHEN 500 MG
650 TABLET ORAL EVERY 6 HOURS
Refills: 0 | Status: DISCONTINUED | OUTPATIENT
Start: 2022-10-20 | End: 2022-11-03

## 2022-10-20 RX ORDER — OXYCODONE HYDROCHLORIDE 5 MG/1
2.5 TABLET ORAL EVERY 6 HOURS
Refills: 0 | Status: DISCONTINUED | OUTPATIENT
Start: 2022-10-20 | End: 2022-10-20

## 2022-10-20 RX ORDER — FENTANYL CITRATE 50 UG/ML
50 INJECTION INTRAVENOUS
Refills: 0 | Status: DISCONTINUED | OUTPATIENT
Start: 2022-10-20 | End: 2022-10-20

## 2022-10-20 RX ORDER — FENTANYL CITRATE 50 UG/ML
25 INJECTION INTRAVENOUS
Refills: 0 | Status: DISCONTINUED | OUTPATIENT
Start: 2022-10-20 | End: 2022-10-20

## 2022-10-20 RX ORDER — CEFDINIR 250 MG/5ML
1 POWDER, FOR SUSPENSION ORAL
Qty: 14 | Refills: 0
Start: 2022-10-20 | End: 2022-10-26

## 2022-10-20 RX ORDER — ONDANSETRON 8 MG/1
4 TABLET, FILM COATED ORAL ONCE
Refills: 0 | Status: DISCONTINUED | OUTPATIENT
Start: 2022-10-20 | End: 2022-11-03

## 2022-10-20 RX ORDER — OXYCODONE HYDROCHLORIDE 5 MG/1
10 TABLET ORAL ONCE
Refills: 0 | Status: DISCONTINUED | OUTPATIENT
Start: 2022-10-20 | End: 2022-10-20

## 2022-10-20 RX ORDER — ACETAMINOPHEN WITH CODEINE 300MG-30MG
1 TABLET ORAL
Qty: 40 | Refills: 0
Start: 2022-10-20 | End: 2022-11-02

## 2022-10-20 RX ADMIN — SODIUM CHLORIDE 30 MILLILITER(S): 9 INJECTION, SOLUTION INTRAVENOUS at 10:04

## 2022-10-20 RX ADMIN — OXYCODONE HYDROCHLORIDE 2.5 MILLIGRAM(S): 5 TABLET ORAL at 18:52

## 2022-10-20 RX ADMIN — OXYCODONE HYDROCHLORIDE 2.5 MILLIGRAM(S): 5 TABLET ORAL at 19:22

## 2022-10-20 NOTE — ASU PREOP CHECKLIST - 3.
Mandarin  used Isabella Oliver  ID # [id number] and obtained permission from patient for son Jeff  to interpret for pt

## 2022-10-20 NOTE — ASU DISCHARGE PLAN (ADULT/PEDIATRIC) - POST OP PHONE #
Patient continues to be irritable and uncooperative. Upset that food was ordered for him. Refusing assessment, medications, vitals and meals. Patient states \"I am refusing everything.\"   manolo Aguilar  pt. granted permission to leave message /and or speak with whoever answers the phone.

## 2022-10-20 NOTE — PHYSICAL EXAM
[Midline] : trachea located in midline position [Normal] : no rashes [de-identified] : left central TM perf (40%); right TM normal

## 2022-10-20 NOTE — HISTORY OF PRESENT ILLNESS
[de-identified] : 82 year old man, 2 month follow up for bilateral profound SNHL. History of Left TM perforation - discussed option for patching i hearing aid can be used - discussed option for possible CI if patient is a candidate

## 2022-10-20 NOTE — ASU DISCHARGE PLAN (ADULT/PEDIATRIC) - NS MD DC FALL RISK RISK
For information on Fall & Injury Prevention, visit: [email or web address] OR  [email or web address] OR  https://www.cdc.gov/steadi/patient.html

## 2022-10-20 NOTE — ASU DISCHARGE PLAN (ADULT/PEDIATRIC) - ASU DC SPECIAL INSTRUCTIONSFT
See pre printed instructions  Prescriptions sent.   Antibiotic. Cefdinir.   Pain medication. Tylenol #3 with codeine

## 2022-10-20 NOTE — ASU DISCHARGE PLAN (ADULT/PEDIATRIC) - CALL YOUR DOCTOR IF YOU HAVE ANY OF THE FOLLOWING:
Bleeding that does not stop/Swelling that gets worse/Pain not relieved by Medications/Wound/Surgical Site with redness, or foul smelling discharge or pus/Nausea and vomiting that does not stop/Inability to tolerate liquids or foods/Increased irritability or sluggishness Bleeding that does not stop/Swelling that gets worse/Pain not relieved by Medications/Fever greater than (need to indicate Fahrenheit or Celsius)/Wound/Surgical Site with redness, or foul smelling discharge or pus/Nausea and vomiting that does not stop/Unable to urinate/Inability to tolerate liquids or foods/Increased irritability or sluggishness
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
## 2022-10-20 NOTE — ASU DISCHARGE PLAN (ADULT/PEDIATRIC) - NURSING INSTRUCTIONS
For the first 2 weeks - sneeze with mouth open, do NOT blow nose, avoid strenuous physical activity. Keep soap and water out of ear. Narcotic pain medication may cause nausea or constipation. Take medication with food. Increase fluids and fiber intake. Make appointment with MD.

## 2022-10-21 ENCOUNTER — NON-APPOINTMENT (OUTPATIENT)
Age: 82
End: 2022-10-21

## 2022-10-26 ENCOUNTER — APPOINTMENT (OUTPATIENT)
Dept: OTOLARYNGOLOGY | Facility: CLINIC | Age: 82
End: 2022-10-26

## 2022-10-26 VITALS
SYSTOLIC BLOOD PRESSURE: 122 MMHG | WEIGHT: 148 LBS | HEIGHT: 63 IN | HEART RATE: 74 BPM | BODY MASS INDEX: 26.22 KG/M2 | DIASTOLIC BLOOD PRESSURE: 71 MMHG

## 2022-10-26 PROCEDURE — 99024 POSTOP FOLLOW-UP VISIT: CPT

## 2022-10-27 LAB — SURGICAL PATHOLOGY STUDY: SIGNIFICANT CHANGE UP

## 2022-11-02 ENCOUNTER — APPOINTMENT (OUTPATIENT)
Dept: OTOLARYNGOLOGY | Facility: CLINIC | Age: 82
End: 2022-11-02

## 2022-11-02 ENCOUNTER — APPOINTMENT (OUTPATIENT)
Dept: SPEECH THERAPY | Facility: CLINIC | Age: 82
End: 2022-11-02

## 2022-11-02 PROCEDURE — 99024 POSTOP FOLLOW-UP VISIT: CPT

## 2022-11-02 RX ORDER — ACETAMINOPHEN AND CODEINE PHOSPHATE 300; 15 MG/1; MG/1
300-15 TABLET ORAL
Qty: 40 | Refills: 0 | Status: DISCONTINUED | COMMUNITY
Start: 2022-10-20

## 2022-11-02 RX ORDER — CEFDINIR 300 MG/1
300 CAPSULE ORAL
Qty: 14 | Refills: 0 | Status: DISCONTINUED | COMMUNITY
Start: 2022-10-20

## 2022-11-02 RX ORDER — METHYLPREDNISOLONE 4 MG/1
4 TABLET ORAL
Qty: 1 | Refills: 0 | Status: DISCONTINUED | COMMUNITY
Start: 2022-10-24 | End: 2022-11-02

## 2022-11-02 NOTE — HISTORY OF PRESENT ILLNESS
[de-identified] : 82 year old man, with history of hearing loss, follow up s/p Right Cochlear Implant 10/20/22.  Son reports severe dizziness since procedure.  Reports tenderness at surgical site - not sleeping on affected side.  States intermittent bilateral tinnitus - unable to turn/move head side to side freely. Denies discharge, drainage, bleeding, dyspnea, headaches, recent fevers or chills.

## 2022-11-02 NOTE — REASON FOR VISIT
[Post-Operative Visit] : a post-operative visit [Family Member] : family member [FreeTextEntry2] : s/p Right CI

## 2022-11-02 NOTE — HISTORY OF PRESENT ILLNESS
[de-identified] : 83 yo M s/p right CI 10/20/22. Son reports that dizziness is improved but not resolved. Now able to walk and can move head with no issues. Continues to have intermittent tinnitus bilaterally but less now. No longer has tenderness at incision site - no drainage.  No fevers or headaches.

## 2022-11-02 NOTE — PHYSICAL EXAM
[de-identified] : wound CDI, CN VII intact, no nystagmus, sero-sangunious fluid in middle ear as anticipated  [de-identified] : significant bidirectional nystagmus

## 2022-11-02 NOTE — PHYSICAL EXAM
[de-identified] : wound CDI, CN VII intact, no nystagmus, sero-sangunious fluid in middle ear as anticipated  - left TM intact - sprayed with mastoid powder

## 2022-11-30 ENCOUNTER — NON-APPOINTMENT (OUTPATIENT)
Age: 82
End: 2022-11-30

## 2022-11-30 ENCOUNTER — APPOINTMENT (OUTPATIENT)
Dept: OTOLARYNGOLOGY | Facility: CLINIC | Age: 82
End: 2022-11-30

## 2022-11-30 ENCOUNTER — APPOINTMENT (OUTPATIENT)
Dept: SPEECH THERAPY | Facility: CLINIC | Age: 82
End: 2022-11-30

## 2022-11-30 VITALS
SYSTOLIC BLOOD PRESSURE: 133 MMHG | DIASTOLIC BLOOD PRESSURE: 74 MMHG | HEART RATE: 91 BPM | HEIGHT: 63 IN | WEIGHT: 152 LBS | BODY MASS INDEX: 26.93 KG/M2

## 2022-11-30 DIAGNOSIS — H72.02 CENTRAL PERFORATION OF TYMPANIC MEMBRANE, LEFT EAR: ICD-10-CM

## 2022-11-30 DIAGNOSIS — R42 DIZZINESS AND GIDDINESS: ICD-10-CM

## 2022-11-30 PROCEDURE — 99024 POSTOP FOLLOW-UP VISIT: CPT

## 2022-11-30 RX ORDER — AMMONIUM LACTATE 12 %
12 CREAM (GRAM) TOPICAL
Qty: 280 | Refills: 0 | Status: DISCONTINUED | COMMUNITY
Start: 2022-07-20 | End: 2022-11-30

## 2022-11-30 RX ORDER — MECLIZINE HYDROCHLORIDE 25 MG/1
25 TABLET ORAL EVERY 6 HOURS
Qty: 360 | Refills: 3 | Status: DISCONTINUED | COMMUNITY
Start: 2022-10-24 | End: 2022-11-30

## 2022-11-30 NOTE — HISTORY OF PRESENT ILLNESS
[Ear Infections] : ear infections [Imbalance or Dizziness] : imbalance or dizziness [FreeTextEntry1] : Mr. Jarrett, an 82 year old male, has a known long-standing progressive hearing loss bilaterally. Fit with a hearing aid for the right ear in 2000. Mr. Jarrett received a cochlear implant for his right ear in October 2022. He does not currently use any technology for the left ear. [FreeTextEntry8] : Seen today for 1 month follow up of right CI. Patient very happy with progress so far. Is able to understand "80% of conversation face to face (with lipreading), 40% with video chat". Not able to use phone yet.

## 2022-11-30 NOTE — ASSESSMENT
[FreeTextEntry1] : EQUIPMENT\par \par Right CI\par : Cochlear\par Internal: \par Implant date: 10/20/2022\par Activation date: 2022\par Surgeon: Dr. Kat\par Processor type(s): [id number], [id number] (did not activate)\par Serial numbers: [id number], [id number]\par Magnet strength/color: beige SP with black CC/x3(I), black\par Dataloggin.9 hours/day\par \par MAPPING\par Right CI\par Impedances: WNL all electrodes\par Disabled electrodes: n/a\par NRT: ran intraoperatively\par P1: #7- New Ts via hand raising, Cs measured\par P2: #8- Cs inc by 2\par P3: #9- Cs inc by 2\par P4: #10- Cs inc by 2\par Volume/sensitivity: \par Processing strategy: ACE+ SCAN\par Parameters: 50 PW, 900 Hz, 8 maxima\par \par Patient would like to swap N7 for N8- beige SP and battery with black coil. Will email Cochlear regarding this.\par \par Patient to bring backpack at next appointment to [facility] 2. Wants to keep one BTE and one OTE for now. \par \par Patient wanted stronger magnet- keeping x3(I), feels appropriate- counseled regarding this. Replaced earhook (missing). Switched settings on iphone so no longer hears ringtones/system sounds to CI as requested. \par \par Patient to [employer] programs weekly, as comfortable.\par \par Patient to f/up with [employer] to obtain earmold for left ear so he can try using his old right HA on the left ear.\par \par Patient reported sometimes sound cuts off for one second then comes back on. Recommended swapping coil to se if this solves issue.

## 2022-11-30 NOTE — PLAN
[FreeTextEntry2] : 1. Full time use of CI.\par 2. F/up mapping, 1 month.\par 3. Continued otologic management.

## 2022-11-30 NOTE — REASON FOR VISIT
[Follow-Up] : follow-up for [Cochlear Implant] : cochlear implant [Family Member] : family member [Other: ______] : provided by [facility] [FreeTextEntry1] : Dr. Kat [Interpreters_Relationshiptopatient] : son [FreeTextEntry3] : Patient requested his son serve as an  [TWNoteComboBox1] : Chinese

## 2022-12-05 PROBLEM — H72.02 CENTRAL PERFORATION OF TYMPANIC MEMBRANE OF LEFT EAR: Status: ACTIVE | Noted: 2022-08-09

## 2022-12-05 PROBLEM — R42 VERTIGO: Status: ACTIVE | Noted: 2022-10-24

## 2022-12-05 NOTE — HISTORY OF PRESENT ILLNESS
[de-identified] : 83 yo M s/p right CI 10/20/22 - improvement in dizziness. Now has slight unsteadiness when walk. No longer has tinnitus.No drainage from incision site. No otorrhea, otalgia or headaches.

## 2022-12-05 NOTE — PHYSICAL EXAM
[Normal] : mucosa is normal [Midline] : trachea located in midline position [de-identified] : wound CDI, CN VII intact, no nystagmus, Tms normal and intact [de-identified] : significant bidirectional nystagmus

## 2022-12-28 ENCOUNTER — APPOINTMENT (OUTPATIENT)
Dept: SPEECH THERAPY | Facility: CLINIC | Age: 82
End: 2022-12-28

## 2022-12-28 ENCOUNTER — OUTPATIENT (OUTPATIENT)
Dept: OUTPATIENT SERVICES | Facility: HOSPITAL | Age: 82
LOS: 1 days | Discharge: HOME | End: 2022-12-28

## 2022-12-28 DIAGNOSIS — Z41.9 ENCOUNTER FOR PROCEDURE FOR PURPOSES OTHER THAN REMEDYING HEALTH STATE, UNSPECIFIED: Chronic | ICD-10-CM

## 2022-12-28 DIAGNOSIS — Z90.79 ACQUIRED ABSENCE OF OTHER GENITAL ORGAN(S): Chronic | ICD-10-CM

## 2022-12-28 DIAGNOSIS — Z90.49 ACQUIRED ABSENCE OF OTHER SPECIFIED PARTS OF DIGESTIVE TRACT: Chronic | ICD-10-CM

## 2022-12-28 NOTE — HISTORY OF PRESENT ILLNESS
[Ear Infections] : ear infections [Imbalance or Dizziness] : imbalance or dizziness [FreeTextEntry1] : Mr. Jarrett, an 82 year old male, has a known long-standing progressive hearing loss bilaterally. Fit with a hearing aid for the right ear in 2000. Mr. Jarrett received a cochlear implant for his right ear in October 2022. He has a hearing aid for the left ear that he uses occasionally.  [FreeTextEntry8] : Seen today for 2 month follow up of right CI. Patient and his son report he is able to understand 80-90% of speech with lipreading cues. Difficulty reported when cannot see speaker, with music and phone. Counseled regarding progress so far and expectations.

## 2022-12-28 NOTE — ASSESSMENT
[FreeTextEntry1] : EQUIPMENT\par \par Right CI\par : Cochlear\par Internal: \par Implant date: 10/20/2022\par Activation date: 11/2/2022\par Surgeon: Dr. Kat\par Processor type(s): [id number], [id number] \par Serial numbers: [id number], [id number]\par Magnet strength/color: beige SP with black CC/x4*(I), black/x4(I)\par Datalogging: 15.1 hours/day\par \par MAPPING\par Right CI\par Impedances: WNL all electrodes\par Disabled electrodes: n/a\par NRT: ran intraoperatively\par P1: #12/14- New Ts via hand raising, Cs in live speech\par P2: #13/15- Cs inc by 5\par Volume/sensitivity: 6/12\par Processing strategy: ACE+ SCAN\par Parameters: 50 PW, 900 Hz, 8 maxima\par \par Set up Kanso 2. Reviewed use and care with patient and his son. \par \par Gave patient x4(I) magnet to try for N7 due to complaints of it falling off often. Told patient if feels any pain/pressure/discomfort/redness must switch back to x3(I). Patient and his son expressed understanding.\par \par \par Downloaded Nucleus Smart cecy, reviewed use. Also discussed bass/treble/master volume controls. Discussed forward focus feature.\par \par Patient to follow-up with [facility] to regarding left ITE HA- interested in adding volume control.\par \par Patient to bring backpack to f/up if we receive N8 can swap, also can pair accessories.

## 2022-12-28 NOTE — PLAN
[FreeTextEntry2] : 1. Full time use of CI.\par 2. F/up 1 month (3 mo eval).\par 3. Continued otologic management.

## 2022-12-29 DIAGNOSIS — H90.3 SENSORINEURAL HEARING LOSS, BILATERAL: ICD-10-CM

## 2023-01-24 ENCOUNTER — OUTPATIENT (OUTPATIENT)
Dept: OUTPATIENT SERVICES | Facility: HOSPITAL | Age: 83
LOS: 1 days | Discharge: HOME | End: 2023-01-24

## 2023-01-24 ENCOUNTER — APPOINTMENT (OUTPATIENT)
Dept: SPEECH THERAPY | Facility: CLINIC | Age: 83
End: 2023-01-24

## 2023-01-24 DIAGNOSIS — Z41.9 ENCOUNTER FOR PROCEDURE FOR PURPOSES OTHER THAN REMEDYING HEALTH STATE, UNSPECIFIED: Chronic | ICD-10-CM

## 2023-01-24 DIAGNOSIS — Z90.49 ACQUIRED ABSENCE OF OTHER SPECIFIED PARTS OF DIGESTIVE TRACT: Chronic | ICD-10-CM

## 2023-01-24 DIAGNOSIS — Z90.79 ACQUIRED ABSENCE OF OTHER GENITAL ORGAN(S): Chronic | ICD-10-CM

## 2023-01-25 DIAGNOSIS — H90.3 SENSORINEURAL HEARING LOSS, BILATERAL: ICD-10-CM

## 2023-02-17 ENCOUNTER — APPOINTMENT (OUTPATIENT)
Dept: COLORECTAL SURGERY | Facility: CLINIC | Age: 83
End: 2023-02-17
Payer: MEDICARE

## 2023-02-17 VITALS
SYSTOLIC BLOOD PRESSURE: 131 MMHG | BODY MASS INDEX: 26.22 KG/M2 | HEIGHT: 63 IN | WEIGHT: 148 LBS | HEART RATE: 84 BPM | DIASTOLIC BLOOD PRESSURE: 69 MMHG | TEMPERATURE: 97.9 F

## 2023-02-17 DIAGNOSIS — C18.9 MALIGNANT NEOPLASM OF COLON, UNSPECIFIED: ICD-10-CM

## 2023-02-17 DIAGNOSIS — K62.7 RADIATION PROCTITIS: ICD-10-CM

## 2023-02-17 DIAGNOSIS — K64.8 OTHER HEMORRHOIDS: ICD-10-CM

## 2023-02-17 PROCEDURE — 46500 INJECTION INTO HEMORRHOID(S): CPT

## 2023-02-17 PROCEDURE — 99214 OFFICE O/P EST MOD 30 MIN: CPT | Mod: 25

## 2023-02-17 RX ORDER — DOCUSATE SODIUM 100 MG/1
100 CAPSULE ORAL
Qty: 60 | Refills: 2 | Status: ACTIVE | COMMUNITY
Start: 2023-02-17 | End: 1900-01-01

## 2023-02-17 RX ORDER — MESALAMINE 1000 MG/1
1000 SUPPOSITORY RECTAL
Qty: 30 | Refills: 1 | Status: ACTIVE | COMMUNITY
Start: 2023-02-17 | End: 1900-01-01

## 2023-02-17 NOTE — PHYSICAL EXAM
[No HSM] : no hepatosplenomegaly [Normal] : was normal [None] : there was no rectal mass  [Normal Breath Sounds] : Normal breath sounds [Normal Heart Sounds] : normal heart sounds [No Rash or Lesion] : No rash or lesion [Calm] : calm [Abdomen Masses] : No abdominal masses [Abdomen Tenderness] : ~T No ~M abdominal tenderness [JVD] : no jugular venous distention  [de-identified] : Abdomen is soft, nontender, nondistended. Incisions are well healed. No hernia or masses\par  [FreeTextEntry1] : Medical assistant was present for the entire exam.\par \par Anoscopy was performed for evaluation of the patients rectal bleeding  history .\par The risks, benefits and alternatives were reviewed.\par \par A lighted anoscope was passed into the anal canal and the entire anal mucosal surface was inspected..  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

## 2023-02-17 NOTE — ASSESSMENT
[FreeTextEntry1] : Recommend surveillance labs and imaging for history of colorectal cancer–stage II.\par \par In relationship to his rectal bleeding I suspect this may be multifactorial including radiation proctitis and internal hemorrhoids injection sclerotherapy performed today with good result.  Recommend 5-ASA suppository.  Recommend stool softener.\par \par If bleeding is persistent advised return in 1 month for formal therapy.\par \par A chinese  was utilized for the entire visit . All questions were addressed.\par

## 2023-02-17 NOTE — HISTORY OF PRESENT ILLNESS
[FreeTextEntry1] : 81 y/o M presents for follow up evaluation of colon cancer s/p left hemicolectomy 1/12/2022\par \par PMH of HLD, T2DM, arthritis, sleep apnea (not on CPAP), cardiac arrhythmia (currently under the care of Dr. rBown on Metoprolol), hard of hearing (R ear hearing aid)\par H/o prostate CA, s/p radiation treatment x 48 at [facility] followed by ?orchiectomy surgery 2009 in [place] as patient was told cancer had spread. Denies h/o chemo. PCP monitors PSA and otherwise normal per patient\par FMH: Mother (+) colon cancer diagnosed this year in her 90's (likely palliative care, has HHA and lives with patient)\par \par Final Diagnosis\par Colon, left hemicolectomy:\par - Moderately differentiated adenocarcinoma invading the\par pericolorectal soft tissue\par - No lymphovascular invasion identified\par - Sixteen pericolic lymph nodes negative for tumor (0/16)\par - Proximal, distal and mesenteric resection margins negative for\par tumor\par - See synoptic report below\par Verified by: VALDEZ Soares MD, PhD\par (Electronic Signature)\par Reported on: 01/17/22 13:58 EST, [facility], [address] [place] [address],\[place] [address]\par Phone: [phone] Fax: [phone]\par _________________________________________________________________\par Comment\par MOL (1F, 1G)\par Synoptic Summary\par \par SPECIMEN\par Procedure: Left hemicolectomy\par Macroscopic Evaluation of Mesorectum: Not applicable\par TUMOR\par Tumor Site: Descending colon\par Histologic Type: Adenocarcinoma\par Histologic Grade: G2, moderately differentiated\par Tumor Size: Greatest dimension (Centimeters) - 2.5 x 2.4 x 0.8 cm\par Multiple Primary Sites: Not applicable\par Tumor Extent: Invades through muscularis propria into pericolorectal\par tissue\par Macroscopic Tumor Perforation: Not identified\par Lymphovascular Invasion: Not identified\par Perineural Invasion: Not identified\par Treatment Effect: No known presurgical therapy\par MARGINS\par Margin Status for Invasive Carcinoma: All margins negative for invasive\par carcinoma\par Distance from Invasive Carcinoma to Radial (Circumferential)\par Margin: 45 mm\par Distance from Invasive Carcinoma to Closest Mucosal Margin: 35 mm\par Margin Status for Non-Invasive Tumor: Not applicable\par REGIONAL LYMPH NODES\par Regional Lymph Node Status: All regional lymph nodes negative for tumor\par Number of Lymph Nodes Examined: 16\par Tumor Deposits: Not identified\par DISTANT METASTASIS\par Distant Site(s) Involved: Cannot be determined\par PATHOLOGIC STAGE CLASSIFICATION (pTNM, AJCC 8th Edition)\par Reporting of pT, pN, and (when applicable) pM categories is based\par on information available to the pathologist at the time the report\par is issued. As per the AJCC (Chapter 1, 8th Ed.) it is the managing\par physician's responsibility to establish the final pathologic stage based\par upon all pertinent information, including but potentially not limited to\par this pathology report.\par TNM Descriptors: Not applicable\par pT Category: pT3\par pN Category: pN0\par pM Category: Not applicable - pM cannot be determined from the\par submitted specimen(s)\par \par CT Chest 1/10/22\par Impression: \par No definite hematogenous metastatic pattern however there is a 7 mm solid nodule within the right lower lobe which may demonstrate central fat attenuation to suggest benign hamartoma. \par Pleural and parenchymal scarring as well as areas of cylindrical and cystic bronchiectasis most postinflammatory. Tubular focus of mucous plugging within the left upper lobe is noted. \par Small type I hiatal hernia. \par Mild mediastinal perihilar lymphadenopathy which may be reactive. \par \par Most recently seen in follow up 2/4/22, No abdominal mass, or tenderness. Incisions are well healed. Patient recovering well s/p left robotic hemicolectomy. Recommended surveillance with labs and yearly CT scan. F/u GI for 1 year surveillance colonoscopy. \par \par Patient returns today in follow up \par \par Reports intermittent rectal bleeding.  Excellent energy and appetite.\par Colonoscopy performed in May 22 revealed mild granulation tissue at anastomosis and internal hemorrhoids.

## 2023-02-27 ENCOUNTER — APPOINTMENT (OUTPATIENT)
Dept: SPEECH THERAPY | Facility: CLINIC | Age: 83
End: 2023-02-27

## 2023-03-01 ENCOUNTER — NON-APPOINTMENT (OUTPATIENT)
Age: 83
End: 2023-03-01

## 2023-03-03 LAB
ALBUMIN SERPL ELPH-MCNC: 4.4 G/DL
ALP BLD-CCNC: 82 U/L
ALT SERPL-CCNC: 46 U/L
ANION GAP SERPL CALC-SCNC: 14 MMOL/L
AST SERPL-CCNC: 44 U/L
BASOPHILS # BLD AUTO: 0.06 K/UL
BASOPHILS NFR BLD AUTO: 0.8 %
BILIRUB SERPL-MCNC: 0.3 MG/DL
BUN SERPL-MCNC: 16 MG/DL
CALCIUM SERPL-MCNC: 9.9 MG/DL
CEA SERPL-MCNC: 2.2 NG/ML
CHLORIDE SERPL-SCNC: 99 MMOL/L
CO2 SERPL-SCNC: 24 MMOL/L
CREAT SERPL-MCNC: 0.62 MG/DL
EGFR: 97 ML/MIN/1.73M2
EOSINOPHIL # BLD AUTO: 0.14 K/UL
EOSINOPHIL NFR BLD AUTO: 1.9 %
GLUCOSE SERPL-MCNC: 231 MG/DL
HCT VFR BLD CALC: 40.4 %
HGB BLD-MCNC: 13.4 G/DL
IMM GRANULOCYTES NFR BLD AUTO: 0.3 %
LYMPHOCYTES # BLD AUTO: 1.67 K/UL
LYMPHOCYTES NFR BLD AUTO: 22.4 %
MAN DIFF?: NORMAL
MCHC RBC-ENTMCNC: 30.9 PG
MCHC RBC-ENTMCNC: 33.2 GM/DL
MCV RBC AUTO: 93.3 FL
MONOCYTES # BLD AUTO: 0.54 K/UL
MONOCYTES NFR BLD AUTO: 7.2 %
NEUTROPHILS # BLD AUTO: 5.02 K/UL
NEUTROPHILS NFR BLD AUTO: 67.4 %
PLATELET # BLD AUTO: 274 K/UL
POTASSIUM SERPL-SCNC: 4.3 MMOL/L
PROT SERPL-MCNC: 7.1 G/DL
RBC # BLD: 4.33 M/UL
RBC # FLD: 13.2 %
SODIUM SERPL-SCNC: 137 MMOL/L
WBC # FLD AUTO: 7.45 K/UL

## 2023-03-22 ENCOUNTER — NON-APPOINTMENT (OUTPATIENT)
Age: 83
End: 2023-03-22

## 2023-05-31 ENCOUNTER — APPOINTMENT (OUTPATIENT)
Dept: OTOLARYNGOLOGY | Facility: CLINIC | Age: 83
End: 2023-05-31
Payer: MEDICARE

## 2023-05-31 ENCOUNTER — APPOINTMENT (OUTPATIENT)
Dept: SPEECH THERAPY | Facility: CLINIC | Age: 83
End: 2023-05-31

## 2023-05-31 DIAGNOSIS — R26.89 OTHER ABNORMALITIES OF GAIT AND MOBILITY: ICD-10-CM

## 2023-05-31 DIAGNOSIS — H90.3 SENSORINEURAL HEARING LOSS, BILATERAL: ICD-10-CM

## 2023-05-31 PROCEDURE — 99213 OFFICE O/P EST LOW 20 MIN: CPT

## 2023-06-06 PROBLEM — R26.89 IMBALANCE: Status: ACTIVE | Noted: 2023-06-06

## 2023-06-06 PROBLEM — H90.3 ASYMMETRIC SENSORINEURAL DEAFNESS: Status: ACTIVE | Noted: 2022-05-23

## 2023-06-06 NOTE — PHYSICAL EXAM
[Midline] : trachea located in midline position [Normal] : cranial nerves 2-12 intact [de-identified] : wound CDI, CN VII intact, no nystagmus, Tms normal and intact

## 2023-06-06 NOTE — HISTORY OF PRESENT ILLNESS
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 History of vertigo - imbalance - Left TM perforation.  Recommended for vestibular rehab - chose to defer at the time.  Encouraged hearing aid fitting for contralateral ear.  Still with imbalance, no spinning vertigo - cochlear implant mapping going well

## 2023-09-06 ENCOUNTER — APPOINTMENT (OUTPATIENT)
Dept: SPEECH THERAPY | Facility: CLINIC | Age: 83
End: 2023-09-06

## 2024-02-12 ENCOUNTER — APPOINTMENT (OUTPATIENT)
Dept: COLORECTAL SURGERY | Facility: CLINIC | Age: 84
End: 2024-02-12

## 2024-05-06 NOTE — PRE-OP CHECKLIST - TEMPERATURE IN FAHRENHEIT (DEGREES F)
Spoke to patient and she wants to know if Dr. Buck got the injection information from Dr. Lew yet. Patient wants to schedule injection.   98.8

## 2025-04-11 ENCOUNTER — APPOINTMENT (OUTPATIENT)
Dept: SPEECH THERAPY | Facility: CLINIC | Age: 85
End: 2025-04-11

## 2025-05-05 NOTE — H&P ADULT - NSHPPOADEEPVENOUSTHROMB_GEN_A_CORE
Left femoral neck fracture noted on x-ray imaging  CT of the left hip showed mildly displaced and angulated transcervical left femoral neck fracture.  There is a small foci of air in the left groin/inguinal region and focal thickening of the anterior right bladder wall.  Right greater than left fat-containing inguinal hernias.  Patient underwent left hip open reduction internal fixation of the femoral neck fracture 5/6.  PT OT recs postop.  DVT prophylaxis with SCDs and Lovenox until mobilizing independently and then can be switched to aspirin for 4 weeks.  Patient will need to follow-up with orthopedic surgery in 2 weeks for wound check and suture/staple removal.  Continue on IV Dilaudid and p.o. oxy as needed. Continue pulse oximetry monitoring to monitor for hypoxia and respiratory depression while receiving IV narcotic medications       no

## (undated) DEVICE — NDL SPINAL 22G X 3.5" (BLACK)

## (undated) DEVICE — DRSG DERMABOND 0.7ML

## (undated) DEVICE — VENODYNE/SCD SLEEVE CALF MEDIUM

## (undated) DEVICE — DRAPE MAYO STAND 30"

## (undated) DEVICE — XI DRAPE COLUMN

## (undated) DEVICE — TIP METZENBAUM SCISSOR MONOPOLAR ENDOCUT (ORANGE)

## (undated) DEVICE — LIGASURE BLUNT TIP 37CM

## (undated) DEVICE — DRAPE MICROSCOPE OPMI VISIONGUARD 48X118"

## (undated) DEVICE — PACK GENERAL CLOSING

## (undated) DEVICE — XI STAPLER SUREFORM 60

## (undated) DEVICE — DRAPE IOBAN 23" X 23"

## (undated) DEVICE — TUBING COOLANT

## (undated) DEVICE — NDL SPINAL 22G X 2.5" QUINCKE

## (undated) DEVICE — SOL IRR POUR H2O 500ML

## (undated) DEVICE — XI ARM SCISSOR MONO CURVED

## (undated) DEVICE — URETERAL CATH RED RUBBER 20FR (YELLOW)

## (undated) DEVICE — DRSG STERISTRIPS 0.5 X 4"

## (undated) DEVICE — DRSG KLING 4"

## (undated) DEVICE — ELCTR GROUNDING PAD ADULT COVIDIEN

## (undated) DEVICE — SYR LUER LOK 3CC

## (undated) DEVICE — XI DRAPE ARM

## (undated) DEVICE — DRILL BIT ANSPACH DIAMOND BALL 3MMX5CM

## (undated) DEVICE — SUT MONOCRYL 4-0 27" PS-2 UNDYED

## (undated) DEVICE — Device

## (undated) DEVICE — CONN FEMALE LUER ADAPTOR SM XMAS TREE

## (undated) DEVICE — BUR ANSPACH BALL FLUTED EXT 3MM

## (undated) DEVICE — ELCTR MONOPOLAR STIMULATOR PROBE FLUSH-TIP

## (undated) DEVICE — PREP BETADINE KIT

## (undated) DEVICE — DRAPE 3/4 SHEET 52X76"

## (undated) DEVICE — TUBING IRRIGATION

## (undated) DEVICE — SPECIMEN CONTAINER 4OZ

## (undated) DEVICE — XI ARM GRASPER TIP UP FENESTRATED

## (undated) DEVICE — SUT PLAIN GUT FAST ABSORBING 5-0 PC-1

## (undated) DEVICE — DRSG TELFA 3 X 8

## (undated) DEVICE — DRAPE CAMERA ENDOMATE

## (undated) DEVICE — DRSG MASTISOL

## (undated) DEVICE — DRAPE MICROSCOPE ZEISS W CLEARLENS

## (undated) DEVICE — DRILL BIT ANSPACH FLUTED BALL 3MMX5CM

## (undated) DEVICE — DRILL BIT ANSPACH FLUTED ACORN 5MMX25.4MM

## (undated) DEVICE — PACK MASTOID/MIDDLE EAR

## (undated) DEVICE — SUT VICRYL 3-0 27" SH

## (undated) DEVICE — SUT VICRYL PLUS 0 36" CT-1

## (undated) DEVICE — POSITIONER PINK PAD PIGAZZI SYSTEM XL W ARM PROTECTOR

## (undated) DEVICE — DRAPE TOWEL BLUE STICKY

## (undated) DEVICE — INSUFFLATION NDL COVIDIEN SURGINEEDLE VERESS 120MM

## (undated) DEVICE — DRSG PELLET

## (undated) DEVICE — DRAPE TOWEL BLUE 17" X 24"

## (undated) DEVICE — DRILL BIT ANSPACH DIAMOND BALL 1MMX5CM

## (undated) DEVICE — PACK PERI GYN

## (undated) DEVICE — LONE STAR RETRACTOR RING 14.1X14.1CM DISP

## (undated) DEVICE — BIPOLAR FORCEP CORD WECK STANDARD 12FT

## (undated) DEVICE — CATH IV SAFE INSYTE 14G X 1.75" (ORANGE)

## (undated) DEVICE — BUR ANSPACH DIAMOND BALL 1MM 7.3CM

## (undated) DEVICE — WARMING BLANKET UPPER ADULT

## (undated) DEVICE — DRAPE TOP SHEET 53" X 101"

## (undated) DEVICE — MARKING PEN W RULER

## (undated) DEVICE — WARMING BLANKET FULL ADULT

## (undated) DEVICE — SUT VICRYL 0 54" TIES

## (undated) DEVICE — PACK GENERAL LAPAROSCOPY

## (undated) DEVICE — BLADE SURGICAL #15 CARBON

## (undated) DEVICE — DRAPE 1/2 SHEET 40X57"

## (undated) DEVICE — GLV 8 PROTEXIS (WHITE)

## (undated) DEVICE — XI OBTURATOR OPTICAL BLADELESS 8MM

## (undated) DEVICE — DRAPE THYROID 77" X 123"

## (undated) DEVICE — TAPE SILK 3"

## (undated) DEVICE — DRILL BIT ANSPACH FLUTED BALL 4MM X 5CM

## (undated) DEVICE — STOPCOCK 4-WAY

## (undated) DEVICE — TUBING STRYKER PNEUMOCLEAR HIGH FLOW HEATED

## (undated) DEVICE — XI VESSEL SEALER

## (undated) DEVICE — SOL IRR POUR NS 0.9% 1500ML

## (undated) DEVICE — XI SEAL UNIV 5- 8 MM

## (undated) DEVICE — XI 12MM AND STAPLER CANNULA SEAL

## (undated) DEVICE — STRYKER RIO SYSTEM IRRIGATION TUBE

## (undated) DEVICE — SUT ETHIBOND 2-0 30" RB-1

## (undated) DEVICE — SYR LUER LOK 20CC

## (undated) DEVICE — DRILL BIT ANSPACH DIAMOND BALL 2MMX5CM

## (undated) DEVICE — DRSG TAPE UMBILICAL COTTON 2" X 30 X 1/8"

## (undated) DEVICE — XI ARM FORCEP FENESTRATED BIPOLAR 8MM

## (undated) DEVICE — D HELP - CLEARVIEW CLEARIFY SYSTEM

## (undated) DEVICE — XI ARM NEEDLE DRIVER LARGE

## (undated) DEVICE — XI TIP COVER

## (undated) DEVICE — SUT VICRYL 0 18" ENDOLOOP LIGATURE

## (undated) DEVICE — LABELS BLANK W PEN

## (undated) DEVICE — GLV 7 PROTEXIS (WHITE)

## (undated) DEVICE — DRILL BIT ANSPACH DIAMOND BALL 1.5MMX5CM

## (undated) DEVICE — SPONGE ENDO PEANUT 5MM

## (undated) DEVICE — GLV 7.5 PROTEXIS (WHITE)

## (undated) DEVICE — XI ENDOWRIST STAPLER SHEATH

## (undated) DEVICE — SOL IRR POUR NS 0.9% 500ML

## (undated) DEVICE — BUR MEDTRONIC ENT VISAO COCHLEOSTOMY CURVED FINE DIAMOND 20 DEGREE 1.0MM X 98MM

## (undated) DEVICE — STAPLER COVIDIEN ENDO GIA SHORT HANDLE

## (undated) DEVICE — BUR MEDTRONIC ENT VISAO COCHLEOSTOMY CURVED FINE DIAMOND 20 DEGREE 1.5MM X 98MM

## (undated) DEVICE — POSITIONER STRAP KNEE & BODY 3X60" DISP

## (undated) DEVICE — BUR J&J BALL DIAMOND 1X7.3CM

## (undated) DEVICE — SYR LUER LOK 30CC

## (undated) DEVICE — ELCTR BOVIE PENCIL HANDPIECE ROCKER SWITCH 15FT

## (undated) DEVICE — DRILL BIT ANSPACH DIAMOND BALL 4MM X 25.4MM

## (undated) DEVICE — POSITIONER PINK PAD PIGAZZI SYSTEM

## (undated) DEVICE — SUT SILK 3-0 18" SH (POP-OFF)

## (undated) DEVICE — SUT CHROMIC 3-0 27" RB-1

## (undated) DEVICE — ELCTR BOVIE TIP BLADE INSULATED 2.75" EDGE

## (undated) DEVICE — KIT ENDO PROCEDURE CUST W/VLV

## (undated) DEVICE — XI ENDOWRIST 12 - 8 MM CANNULA REDUCER

## (undated) DEVICE — COTTONBALL LG

## (undated) DEVICE — DRAIN PENROSE .5" X 18" LATEX

## (undated) DEVICE — MARKING PEN W RULER / LABELS

## (undated) DEVICE — DRAPE SPLIT SHEET 77" X 120"

## (undated) DEVICE — SUT PDS II PLUS 1 27" CT-1

## (undated) DEVICE — SYR LUER LOK 1CC

## (undated) DEVICE — CANISTER DISPOSABLE THIN WALL 3000CC

## (undated) DEVICE — CLIP IRRIGATIION FOR QD8/QD5S ANGLE ATTACHMENT

## (undated) DEVICE — SUT MONOCRYL 4-0 18" P-3 UNDYED

## (undated) DEVICE — ELCTR NDL SUBDERMAL 2 CHANNEL

## (undated) DEVICE — FOLEY TRAY 16FR 5CC LF UMETER CLOSED